# Patient Record
Sex: MALE | Race: WHITE | NOT HISPANIC OR LATINO | Employment: FULL TIME | URBAN - METROPOLITAN AREA
[De-identification: names, ages, dates, MRNs, and addresses within clinical notes are randomized per-mention and may not be internally consistent; named-entity substitution may affect disease eponyms.]

---

## 2017-01-23 ENCOUNTER — OFFICE VISIT (OUTPATIENT)
Dept: FAMILY MEDICINE | Facility: CLINIC | Age: 42
End: 2017-01-23
Payer: COMMERCIAL

## 2017-01-23 VITALS
WEIGHT: 184.5 LBS | BODY MASS INDEX: 24.99 KG/M2 | DIASTOLIC BLOOD PRESSURE: 86 MMHG | SYSTOLIC BLOOD PRESSURE: 102 MMHG | TEMPERATURE: 98.8 F | HEIGHT: 72 IN

## 2017-01-23 DIAGNOSIS — Z23 ENCOUNTER FOR IMMUNIZATION: ICD-10-CM

## 2017-01-23 DIAGNOSIS — J20.9 ACUTE BRONCHITIS, UNSPECIFIED ORGANISM: ICD-10-CM

## 2017-01-23 DIAGNOSIS — J45.30 MILD PERSISTENT ASTHMA WITHOUT COMPLICATION: Primary | ICD-10-CM

## 2017-01-23 PROCEDURE — 90471 IMMUNIZATION ADMIN: CPT | Performed by: PHYSICIAN ASSISTANT

## 2017-01-23 PROCEDURE — 99203 OFFICE O/P NEW LOW 30 MIN: CPT | Mod: 25 | Performed by: PHYSICIAN ASSISTANT

## 2017-01-23 PROCEDURE — 90686 IIV4 VACC NO PRSV 0.5 ML IM: CPT | Performed by: PHYSICIAN ASSISTANT

## 2017-01-23 RX ORDER — AZITHROMYCIN 250 MG/1
TABLET, FILM COATED ORAL
Qty: 6 TABLET | Refills: 0 | Status: SHIPPED | OUTPATIENT
Start: 2017-01-23 | End: 2018-09-05

## 2017-01-23 RX ORDER — ALBUTEROL SULFATE 90 UG/1
2 AEROSOL, METERED RESPIRATORY (INHALATION) EVERY 6 HOURS PRN
Qty: 1 INHALER | Refills: 3 | Status: SHIPPED | OUTPATIENT
Start: 2017-01-23 | End: 2018-03-11

## 2017-01-23 NOTE — PROGRESS NOTES
SUBJECTIVE:                                                    Henrik Almanzar is a 41 year old male who presents to clinic today for the following health issues:      Acute Illness   Acute illness concerns: cough  Onset: 1 month off and on     Fever: no    Chills/Sweats: YES- a few days ago    Headache (location?): no     Sinus Pressure:no    Conjunctivitis:  no    Ear Pain: no    Rhinorrhea: YES    Congestion: YES- chest    Sore Throat: no     Cough: YES - cough is usually dry, sometimes coughs up phlem.     Wheeze: YES    Decreased Appetite: YES    Nausea: no    Vomiting: no    Diarrhea:  no    Dysuria/Freq.: no    Fatigue/Achiness: YES    Sick/Strep Exposure: YES- patient and his son have been sick on and off for the past month.      Therapies Tried and outcome: albuterol inhaler, nyquil.       Asthma:  Hx of asthma, currently out of Advair and albuterol over the past 1 month, feels like his asthma has been worsening without these medications.  Has been having deep cough, wheezing and sinus pressure ongoing now x 1 month        Problem list and histories reviewed & adjusted, as indicated.  Additional history: as documented    Patient Active Problem List   Diagnosis     Anxiety state     Panic disorder without agoraphobia     Mild persistent asthma     Allergic rhinitis     Hyperlipidemia     HYPERLIPIDEMIA LDL GOAL <160     Past Surgical History   Procedure Laterality Date     Hc removal of tonsils,<10 y/o         Social History   Substance Use Topics     Smoking status: Former Smoker     Quit date: 09/07/2003     Smokeless tobacco: Not on file     Alcohol Use: Yes      Comment: 8 drinks per week, beer     Family History   Problem Relation Age of Onset     Hypertension Father      CANCER Paternal Aunt      cancer, unknown type     CANCER Paternal Grandfather      lung cancer     Lipids Father      CEREBROVASCULAR DISEASE Paternal Uncle      Great uncle         Current Outpatient Prescriptions   Medication  Sig Dispense Refill     fluticasone-salmeterol (ADVAIR) 250-50 MCG/DOSE diskus inhaler Inhale 1 puff into the lungs 2 times daily 3 Inhaler 3     albuterol (PROAIR HFA/PROVENTIL HFA/VENTOLIN HFA) 108 (90 BASE) MCG/ACT Inhaler Inhale 2 puffs into the lungs every 6 hours as needed for shortness of breath / dyspnea or wheezing 1 Inhaler 3     azithromycin (ZITHROMAX) 250 MG tablet Two tablets first day, then one tablet daily for four days. 6 tablet 0     MULTI-VITAMIN OR TABS one tablet daily 30 0     ALBUTEROL 90 MCG/ACT IN AERS 1-2 puffs every every 4-6 hours PRN 1 Inhaler prn     ADVAIR DISKUS 100-50 MCG/DOSE IN MISC 1 inhalation two times per day  1 6 MONTHS     Allergies   Allergen Reactions     Cats      Dogs      No Known Drug Allergies        ROS:  Constitutional, HEENT, cardiovascular, pulmonary, gi and gu systems are negative, except as otherwise noted.    OBJECTIVE:                                                    /86 mmHg  Temp(Src) 98.8  F (37.1  C) (Tympanic)  Ht 6' (1.829 m)  Wt 184 lb 8 oz (83.689 kg)  BMI 25.02 kg/m2  Body mass index is 25.02 kg/(m^2).  GENERAL: healthy, alert and no distress  EYES: Eyes grossly normal to inspection  HENT: ear canals and TM's normal, nose and mouth without ulcers or lesions  NECK: no adenopathy  RESP: lungs clear to auscultation - no rales, rhonchi or wheezes  CV: regular rate and rhythm, normal S1 S2, no S3 or S4, no murmur  Diagnostic Test Results:  none      ASSESSMENT/PLAN:                                                      1. Acute bronchitis, unspecified organism  Ongoing cough and wheezing x 1 month, advise beginning daily controller again and this was refilled. Advise PRN use of albuterol, and will try z pack given length of symptoms.  - azithromycin (ZITHROMAX) 250 MG tablet; Two tablets first day, then one tablet daily for four days.  Dispense: 6 tablet; Refill: 0    2. Mild persistent asthma without complicatio  - fluticasone-salmeterol  (ADVAIR) 250-50 MCG/DOSE diskus inhaler; Inhale 1 puff into the lungs 2 times daily  Dispense: 3 Inhaler; Refill: 3  - albuterol (PROAIR HFA/PROVENTIL HFA/VENTOLIN HFA) 108 (90 BASE) MCG/ACT Inhaler; Inhale 2 puffs into the lungs every 6 hours as needed for shortness of breath / dyspnea or wheezing  Dispense: 1 Inhaler; Refill: 3    3. Encounter for immunization  - ADMIN 1st VACCINE  - FLU VACCINE, 3 YRS +, IM (FLUZONE)    See Patient Instructions    Declan You PA-C  [facility]

## 2017-01-23 NOTE — NURSING NOTE
Chief Complaint   Patient presents with     Cough       Initial /86 mmHg  Temp(Src) 98.8  F (37.1  C) (Tympanic)  Ht 6' (1.829 m)  Wt 184 lb 8 oz (83.689 kg)  BMI 25.02 kg/m2 Estimated body mass index is 25.02 kg/(m^2) as calculated from the following:    Height as of this encounter: 6' (1.829 m).    Weight as of this encounter: 184 lb 8 oz (83.689 kg).  BP completed using cuff size: arya Hernández MA

## 2017-01-23 NOTE — PATIENT INSTRUCTIONS
Acute Bronchitis                  What is acute bronchitis?   Bronchitis is an infection of the air passages--that is, the tubes that connect the windpipe to the lungs. It causes swelling and irritation of the airways. With acute bronchitis you usually have a cough that produces phlegm and pain behind the breastbone when you breathe deeply or cough.   How does it occur?   Bronchitis often occurs with viral infections of the respiratory tract, such as colds and flu. Bronchitis may also be caused by bacterial infections. It may occur with childhood illnesses such as measles and whooping cough.   Attacks are most frequent during the winter or when the level of air pollution is high.   Infants, young children, older adults, smokers, and people with heart disease or lung disease (including asthma and allergies) are most likely to get acute bronchitis.   What are the symptoms?   Symptoms may include:   a deep cough that produces yellowish or greenish phlegm   pain behind the breastbone when you breathe deeply or cough   wheezing   feeling short of breath   fever   chills   headache   sore muscles.   How is it diagnosed?   Your healthcare provider will ask about your symptoms and examine you. You may have tests, such as:   a test of phlegm to look for bacteria   chest X-ray   blood tests.   How is it treated?   Acute bronchitis often does not require medical treatment. Resting at home and drinking plenty of fluids to keep the mucus loose may be all you need to do to get better in a few days. If your symptoms are severe or you have other health problems (such as heart or lung disease or diabetes), you may need to take antibiotics.   How long will the effects last?   Most of the time acute bronchitis clears up in a few days. Your cough may slowly get better in 1 to 2 weeks.   It may take you longer to recover if:   You are a smoker.   You live in an area where air pollution is a problem.   You have a heart  or lung disease.   You have any other continuing health problems.   How can I take care of myself?   You can help yourself by:   following the full treatment your healthcare provider recommends   using a vaporizer, humidifier, or steam from hot water to add moisture to the air   drinking plenty of liquids   taking cough medicine if recommended by your healthcare provider   resting in bed   taking aspirin or acetaminophen to reduce fever and relieve headache and muscle pain (Check with your healthcare provider before you give any medicine that contains aspirin or salicylates to a child or teen. This includes medicines like baby aspirin, some cold medicines, and Pepto Bismol. Children and teens who take aspirin are at risk for a serious illness called Reye's syndrome.)   eating healthy meals.   Call your healthcare provider if:   You have trouble breathing.   You have a fever of 101.5?F (38.6?C) or higher.   You cough up blood.   Your symptoms are getting worse instead of better.   You don't start to feel better after 3 days of treatment.   You have any symptoms that concern you.   How can I help prevent acute bronchitis?   To reduce your risk of getting a respiratory infection:   Do not smoke.   Wash your hands often, especially when you are around people with colds (upper respiratory infections).   If you have asthma or allergies, keep your symptoms under good control.   Get regular exercise.   Eat healthy foods.       Published by "[employer].  This content is reviewed periodically and is subject to change as new health information becomes available. The information is intended to inform and educate and is not a replacement for medical evaluation, advice, diagnosis or treatment by a healthcare professional.   Developed by "[employer].   ? 2010 "[employer] and/or its affiliates. All Rights Reserved.   Copyright   [employer] 2011

## 2017-01-23 NOTE — MR AVS SNAPSHOT
After Visit Summary   1/23/2017    Henrik Almanzar    MRN: [id number]           Patient Information     Date Of Birth          1975        Visit Information        Provider Department      1/23/2017 8:40 AM Declan You PA-C [facility]        Today's Diagnoses     Mild persistent asthma without complication    -  1     Acute bronchitis, unspecified organism           Care Instructions                       Acute Bronchitis                  What is acute bronchitis?   Bronchitis is an infection of the air passages--that is, the tubes that connect the windpipe to the lungs. It causes swelling and irritation of the airways. With acute bronchitis you usually have a cough that produces phlegm and pain behind the breastbone when you breathe deeply or cough.   How does it occur?   Bronchitis often occurs with viral infections of the respiratory tract, such as colds and flu. Bronchitis may also be caused by bacterial infections. It may occur with childhood illnesses such as measles and whooping cough.   Attacks are most frequent during the winter or when the level of air pollution is high.   Infants, young children, older adults, smokers, and people with heart disease or lung disease (including asthma and allergies) are most likely to get acute bronchitis.   What are the symptoms?   Symptoms may include:   a deep cough that produces yellowish or greenish phlegm   pain behind the breastbone when you breathe deeply or cough   wheezing   feeling short of breath   fever   chills   headache   sore muscles.   How is it diagnosed?   Your healthcare provider will ask about your symptoms and examine you. You may have tests, such as:   a test of phlegm to look for bacteria   chest X-ray   blood tests.   How is it treated?   Acute bronchitis often does not require medical treatment. Resting at home and drinking plenty of fluids to keep the mucus loose may be all you need to do to get  better in a few days. If your symptoms are severe or you have other health problems (such as heart or lung disease or diabetes), you may need to take antibiotics.   How long will the effects last?   Most of the time acute bronchitis clears up in a few days. Your cough may slowly get better in 1 to 2 weeks.   It may take you longer to recover if:   You are a smoker.   You live in an area where air pollution is a problem.   You have a heart or lung disease.   You have any other continuing health problems.   How can I take care of myself?   You can help yourself by:   following the full treatment your healthcare provider recommends   using a vaporizer, humidifier, or steam from hot water to add moisture to the air   drinking plenty of liquids   taking cough medicine if recommended by your healthcare provider   resting in bed   taking aspirin or acetaminophen to reduce fever and relieve headache and muscle pain (Check with your healthcare provider before you give any medicine that contains aspirin or salicylates to a child or teen. This includes medicines like baby aspirin, some cold medicines, and Pepto Bismol. Children and teens who take aspirin are at risk for a serious illness called Reye's syndrome.)   eating healthy meals.   Call your healthcare provider if:   You have trouble breathing.   You have a fever of 101.5?F (38.6?C) or higher.   You cough up blood.   Your symptoms are getting worse instead of better.   You don't start to feel better after 3 days of treatment.   You have any symptoms that concern you.   How can I help prevent acute bronchitis?   To reduce your risk of getting a respiratory infection:   Do not smoke.   Wash your hands often, especially when you are around people with colds (upper respiratory infections).   If you have asthma or allergies, keep your symptoms under good control.   Get regular exercise.   Eat healthy foods.       Published by [employer].  This content is reviewed  "periodically and is subject to change as new health information becomes available. The information is intended to inform and educate and is not a replacement for medical evaluation, advice, diagnosis or treatment by a healthcare professional.   Developed by [employer].   ? 2010 [facility] and/or its affiliates. All Rights Reserved.   [employer]   Clinical [employer]                 Follow-ups after your visit        Who to contact     If you have questions or need follow up information about today's clinic visit or your schedule please contact [facility] [place] directly at [phone].  Normal or non-critical lab and imaging results will be communicated to you by [employer], letter or phone within 4 business days after the clinic has received the results. If you do not hear from us within 7 days, please contact the clinic through [employer] or phone. If you have a critical or abnormal lab result, we will notify you by phone as soon as possible.  Submit refill requests through [facility] or call your pharmacy and they will forward the refill request to us. Please allow 3 business days for your refill to be completed.          Additional Information About Your Visit        [facility] Information     [facility] lets you send messages to your doctor, view your test results, renew your prescriptions, schedule appointments and more. To sign up, go to [email or web address] . Click on \"Log in\" on the left side of the screen, which will take you to the Welcome page. Then click on \"Sign up Now\" on the right side of the page.     You will be asked to enter the access code listed below, as well as some personal information. Please follow the directions to create your username and password.     Your access code is: [email or web address]  Expires: 2017  9:25 AM     Your access code will  in 90 days. If you need help or a new code, please call your [facility] or [phone].        Care EveryWhere ID     This " is your Care EveryWhere ID. This could be used by other organizations to access your [place] medical records  [id number]        Your Vitals Were     Temperature Height BMI (Body Mass Index)             98.8  F (37.1  C) (Tympanic) 6' (1.829 m) 25.02 kg/m2          Blood Pressure from Last 3 Encounters:   01/23/17 102/86   05/23/08 143/91   10/26/07 124/71    Weight from Last 3 Encounters:   01/23/17 184 lb 8 oz (83.689 kg)   05/23/08 189 lb (85.73 kg)   10/26/07 190 lb (86.183 kg)              Today, you had the following     No orders found for display         Today's Medication Changes          These changes are accurate as of: 1/23/17  9:25 AM.  If you have any questions, ask your nurse or doctor.               Start taking these medicines.        Dose/Directions    albuterol 108 (90 BASE) MCG/ACT Inhaler   Commonly known as:  PROAIR HFA/PROVENTIL HFA/VENTOLIN HFA   Used for:  Mild persistent asthma without complication   Started by:  Declan You PA-C        Dose:  2 puff   Inhale 2 puffs into the lungs every 6 hours as needed for shortness of breath / dyspnea or wheezing   Quantity:  1 Inhaler   Refills:  3       azithromycin 250 MG tablet   Commonly known as:  ZITHROMAX   Used for:  Acute bronchitis, unspecified organism   Started by:  Declan You PA-C        Two tablets first day, then one tablet daily for four days.   Quantity:  6 tablet   Refills:  0         These medicines have changed or have updated prescriptions.        Dose/Directions    * ADVAIR DISKUS 100-50 MCG/DOSE diskus inhaler   This may have changed:  Another medication with the same name was added. Make sure you understand how and when to take each.   Used for:  Mild persistent asthma   Generic drug:  fluticasone-salmeterol   Changed by:  Ramesh Leigh MD        1 inhalation two times per day   Quantity:  1   Refills:  6 MONTHS       * fluticasone-salmeterol 250-50 MCG/DOSE diskus inhaler   Commonly known as:   ADVAIR   This may have changed:  You were already taking a medication with the same name, and this prescription was added. Make sure you understand how and when to take each.   Used for:  Mild persistent asthma without complication   Changed by:  Declan You PA-C        Dose:  1 puff   Inhale 1 puff into the lungs 2 times daily   Quantity:  3 Inhaler   Refills:  3       * Notice:  This list has 2 medication(s) that are the same as other medications prescribed for you. Read the directions carefully, and ask your doctor or other care provider to review them with you.         Where to get your medicines      These medications were sent to [id number] - [place], [place] - [address] [facility]  [address], [place] [address]    Hours:  24-hours Phone:  [phone]    - albuterol 108 (90 BASE) MCG/ACT Inhaler  - azithromycin 250 MG tablet  - fluticasone-salmeterol 250-50 MCG/DOSE diskus inhaler             Primary Care Provider Office Phone # Fax #    Ramesh Leigh [phone]       [address] [place] [address] [place] [address]        Thank you!     [facility] you [facility]  [facility] your [facility]. Our goal is always to provide you with excellent care. Hearing back from our patients is one way we can continue to improve our services. Please take a few minutes to complete the written survey that you may receive in the mail after your visit with us. Thank you!             Your Updated Medication List - Protect others around you: Learn how to safely use, store and throw away your medicines at www.disposemymeds.org.          This list is accurate as of: 1/23/17  9:25 AM.  Always use your most recent med list.                   Brand Name Dispense Instructions for use    * ADVAIR DISKUS 100-50 MCG/DOSE diskus inhaler   Generic drug:  fluticasone-salmeterol     1    1 inhalation two times per day        * fluticasone-salmeterol 250-50 MCG/DOSE diskus inhaler    ADVAIR    3 Inhaler    Inhale 1 puff into the lungs 2 times daily       albuterol 108 (90 BASE) MCG/ACT Inhaler    PROAIR HFA/PROVENTIL HFA/VENTOLIN HFA    1 Inhaler    Inhale 2 puffs into the lungs every 6 hours as needed for shortness of breath / dyspnea or wheezing       albuterol 90 MCG/ACT inhaler     1 Inhaler    1-2 puffs every every 4-6 hours PRN       azithromycin 250 MG tablet    ZITHROMAX    6 tablet    Two tablets first day, then one tablet daily for four days.       Multi-vitamin Tabs tablet   Generic drug:  multivitamin, therapeutic with minerals     30    one tablet daily       * Notice:  This list has 2 medication(s) that are the same as other medications prescribed for you. Read the directions carefully, and ask your doctor or other care provider to review them with you.

## 2017-06-06 DIAGNOSIS — J45.30 MILD PERSISTENT ASTHMA WITHOUT COMPLICATION: Primary | ICD-10-CM

## 2017-06-06 NOTE — TELEPHONE ENCOUNTER
Pop up message received when pending      ADVAIR DISKUS 100-50 MCG/DOSE IN MISC       Last Written Prescription Date: 10/8/09  Last Fill Quantity: 1, # refills: 6 mos    Last Office Visit with [facility], [facility] or [facility] prescribing provider:  1/23/17   Future Office Visit:       Date of Last Asthma Action Plan Letter:   Asthma Action Plan Q1 Year    Topic Date Due     Asthma Action Plan - yearly  12/15/2006      Asthma Control Test:   ACT Total Scores 1/23/2017   ACT TOTAL SCORE (Goal Greater than or Equal to 20) 10   In the past 12 months, how many times did you visit the emergency room for your asthma without being admitted to the hospital? 0   In the past 12 months, how many times were you hospitalized overnight because of your asthma? 0       Date of Last Spirometry Test:   No results found for this or any previous visit.        Rolanda Degroot TC

## 2017-06-06 NOTE — TELEPHONE ENCOUNTER
Please send patient ACT and request that he send this back.  Can we ask how his asthma has been over the past few months?  Last ACT 10, should get updated form

## 2017-06-06 NOTE — TELEPHONE ENCOUNTER
Routing refill request to provider for review/approval because:  ACT < [facility] protocol for RN Fill  Radha Hinds RN  Triage [employer]

## 2017-06-06 NOTE — TELEPHONE ENCOUNTER
Reason for Call:  Medication or medication refill: advair    Do you use a [facility]?  Name of the pharmacy and phone number for the current request:  [facility] [address]    Name of the medication requested: advair    Other request: na    Can we leave a detailed message on this number? YES    Phone number patient can be reached at: Home number on file [phone] (home)    Best Time: any    Call taken on 6/6/2017 at 12:13 PM by Moustapha Wright

## 2017-06-07 NOTE — TELEPHONE ENCOUNTER
Spoke with the patient, he reports that his asthma is stable and well controlled. Routing to team to mail ACT to patient to complete.   Tiffanie Drake RN   [facility] - Triage

## 2017-06-13 ENCOUNTER — TELEPHONE (OUTPATIENT)
Dept: FAMILY MEDICINE | Facility: CLINIC | Age: 42
End: 2017-06-13

## 2017-06-13 DIAGNOSIS — J45.30 MILD PERSISTENT ASTHMA WITHOUT COMPLICATION: Primary | ICD-10-CM

## 2017-06-13 NOTE — TELEPHONE ENCOUNTER
Advair Diskus is not covered by pt's insurance. Prior Authorization needed. Would you like to do PA? Thank you.    Gypsy Aguillon MA

## 2017-06-13 NOTE — TELEPHONE ENCOUNTER
Spoke with pharmacy, they cannot see his formulary and are not able to tell me what alternative medication would be covered. They advised trying to send in something different and they would run it through his insurance.   Tiffanie Drake RN   [facility] - Triage

## 2017-06-13 NOTE — TELEPHONE ENCOUNTER
Please ask pharmacy to switch this to covered equivalent alternative medication in the same class or find out what this is so we can send it in

## 2017-06-30 ENCOUNTER — TRANSFERRED RECORDS (OUTPATIENT)
Dept: HEALTH INFORMATION MANAGEMENT | Facility: CLINIC | Age: 42
End: 2017-06-30

## 2017-09-13 DIAGNOSIS — J45.30 MILD PERSISTENT ASTHMA WITHOUT COMPLICATION: ICD-10-CM

## 2017-09-13 NOTE — LETTER
September 21, 2017      Henrik Almanzar  [address]   [place] [address]        Dear Henrik,      Please fill out the Asthma Control Test and mail it back in the self addressed envelope. We need this  completed so that we can continue refilling your asthma medications and to make sure your asthma is under control. Any questions please feel free to call.        Sincerely,  Team 2 and Declan You, MPH, PA-C  [facility]  [phone]

## 2017-09-13 NOTE — TELEPHONE ENCOUNTER
DULERA 100-5 MCG/ACT oral inhaler      Last Written Prescription Date: 6/13/17  Last Fill Quantity: 13 g, # refills: 1    Last Office Visit with [facility], [facility] or [facility] prescribing provider:  1/23/17   Future Office Visit:       Date of Last Asthma Action Plan Letter:   Asthma Action Plan Q1 Year    Topic Date Due     Asthma Action Plan - yearly  12/15/2006      Asthma Control Test:   ACT Total Scores 1/23/2017   ACT TOTAL SCORE (Goal Greater than or Equal to 20) 10   In the past 12 months, how many times did you visit the emergency room for your asthma without being admitted to the hospital? 0   In the past 12 months, how many times were you hospitalized overnight because of your asthma? 0       Date of Last Spirometry Test:   No results found for this or any previous visit.

## 2017-09-13 NOTE — TELEPHONE ENCOUNTER
Routing refill request to provider for review/approval because:  Act out of date    Anjali Moscoso RN  [facility]  [phone]

## 2017-09-14 RX ORDER — MOMETASONE FUROATE AND FORMOTEROL FUMARATE DIHYDRATE 100; 5 UG/1; UG/1
AEROSOL RESPIRATORY (INHALATION)
Qty: 13 G | Refills: 3 | Status: SHIPPED | OUTPATIENT
Start: 2017-09-14 | End: 2018-09-05

## 2017-10-04 ASSESSMENT — ASTHMA QUESTIONNAIRES: ACT_TOTALSCORE: 23

## 2018-02-22 ENCOUNTER — TELEPHONE (OUTPATIENT)
Dept: FAMILY MEDICINE | Facility: CLINIC | Age: 43
End: 2018-02-22

## 2018-02-22 DIAGNOSIS — J45.909 UNCOMPLICATED ASTHMA, UNSPECIFIED ASTHMA SEVERITY, UNSPECIFIED WHETHER PERSISTENT: Primary | ICD-10-CM

## 2018-02-22 NOTE — TELEPHONE ENCOUNTER
Prior Authorization Specialty Medication Request    Medication/Dose: dulera   Diagnosis and ICD: Mild persistent asthma without complication [J45.30  New/Renewal/Insurance Change PA: renew     Important Lab Values:     Previously Tried and Failed Therapies:     Rationale:      Would you like to include any research articles?    If yes please include the hyperlink(s) below or fax @ [phone].    (Include Name and MRN)    If you received a fax notification from an outside Pharmacy;  Pharmacy Name:[facility]   Pharmacy #: [phone]  Pharmacy Fax: [phone]

## 2018-02-27 NOTE — TELEPHONE ENCOUNTER
PA Initiation    Medication: DULERA 100-5 MCG/ACT   Insurance Company: [employer] - Phone [phone] Fax [phone]  Pharmacy Filling the Rx: [facility] - [place], [place] - [address] AT [facility] [address] [place] [address]  Filling Pharmacy Phone: [phone]  Filling Pharmacy Fax: [phone]  Start Date: 2/27/2018    Central Prior Authorization Team   Phone: [phone]

## 2018-02-28 NOTE — TELEPHONE ENCOUNTER
PRIOR AUTHORIZATION DENIED    Medication: DULERA 100-5 MCG/ACT -denied     Denial Date: 2/28/2018    Denial Rational:  Must try and fail 2 drugs. Have tried none. Symbicort and Spiriva       Appeal Information:

## 2018-03-01 RX ORDER — BUDESONIDE AND FORMOTEROL FUMARATE DIHYDRATE 160; 4.5 UG/1; UG/1
2 AEROSOL RESPIRATORY (INHALATION) 2 TIMES DAILY
Qty: 3 INHALER | Refills: 3 | Status: SHIPPED | OUTPATIENT
Start: 2018-03-01 | End: 2018-09-05

## 2018-03-01 NOTE — TELEPHONE ENCOUNTER
Left non-detailed message to call back and ask to speak with a triage nurse.   Brissa Millard RN

## 2018-03-01 NOTE — TELEPHONE ENCOUNTER
Please advise Dulera was denied.    Pt needs to try symbicort or spiriva he is willing to try new med.    Please send new med.    Sushma Varela CMA

## 2018-03-11 DIAGNOSIS — J45.30 MILD PERSISTENT ASTHMA WITHOUT COMPLICATION: ICD-10-CM

## 2018-03-13 RX ORDER — ALBUTEROL SULFATE 90 UG/1
AEROSOL, METERED RESPIRATORY (INHALATION)
Qty: 1 INHALER | Refills: 0 | Status: SHIPPED | OUTPATIENT
Start: 2018-03-13 | End: 2018-08-19

## 2018-03-13 NOTE — TELEPHONE ENCOUNTER
Medication is being filled for 1 time refill only due to:  Patient needs to be seen because it has been more than one year since last visit.   Please call patient and assist with appt for further refill.  Thank you    Mariely Ordoñez RN

## 2018-03-13 NOTE — TELEPHONE ENCOUNTER
"Requested Prescriptions   Pending Prescriptions Disp Refills     VENTOLIN  (90 BASE) MCG/ACT Inhaler [Pharmacy Med Name: VENTOLIN HFA INH W/DOS CTR 200PUFFS]  Last Written Prescription Date:  1/23/17  Last Fill Quantity: 1,  # refills: 3   Last office visit: 1/23/2017 with prescribing provider:  Avelino   Future Office Visit:      ACT Total Scores 1/23/2017 10/3/2017   ACT TOTAL SCORE (Goal Greater than or Equal to 20) 10 23   In the past 12 months, how many times did you visit the emergency room for your asthma without being admitted to the hospital? 0 0   In the past 12 months, how many times were you hospitalized overnight because of your asthma? 0 0        18 g 0         Sig: INHALE 2 PUFFS INTO THE LUNGS EVERY 6 HOURS AS NEEDED FOR SHORTNESS OF BREATH/DYSPNEA OR WHEEZING    Asthma Maintenance Inhalers - Anticholinergics Failed    3/11/2018  9:56 AM       Failed - Recent (6 mo) or future (30 days) visit within the authorizing provider's specialty    Patient had office visit in the last 6 months or has a visit in the next 30 days with authorizing provider or within the authorizing provider's specialty.  See \"Patient Info\" tab in inbasket, or \"Choose Columns\" in Meds & Orders section of the refill encounter.           Passed - Patient is age 12 years or older       Passed - Asthma control assessment score within normal limits in last 6 months    Please review ACT score.             "

## 2018-03-14 NOTE — TELEPHONE ENCOUNTER
Left message for pt to call back : Patient needs to be seen because it has been more than one year since last visit.   Allison Mandujano,

## 2018-08-15 ENCOUNTER — OFFICE VISIT (OUTPATIENT)
Dept: PSYCHOLOGY | Facility: CLINIC | Age: 43
End: 2018-08-15

## 2018-08-15 DIAGNOSIS — F41.9 ANXIETY DISORDER: Primary | ICD-10-CM

## 2018-08-15 DIAGNOSIS — F10.90 ALCOHOL USE DISORDER: ICD-10-CM

## 2018-08-15 PROCEDURE — 90791 PSYCH DIAGNOSTIC EVALUATION: CPT | Performed by: PSYCHOLOGIST

## 2018-08-15 ASSESSMENT — PATIENT HEALTH QUESTIONNAIRE - PHQ9: 5. POOR APPETITE OR OVEREATING: SEVERAL DAYS

## 2018-08-15 ASSESSMENT — ANXIETY QUESTIONNAIRES
3. WORRYING TOO MUCH ABOUT DIFFERENT THINGS: SEVERAL DAYS
GAD7 TOTAL SCORE: 6
5. BEING SO RESTLESS THAT IT IS HARD TO SIT STILL: NOT AT ALL
2. NOT BEING ABLE TO STOP OR CONTROL WORRYING: SEVERAL DAYS
1. FEELING NERVOUS, ANXIOUS, OR ON EDGE: SEVERAL DAYS
7. FEELING AFRAID AS IF SOMETHING AWFUL MIGHT HAPPEN: SEVERAL DAYS
6. BECOMING EASILY ANNOYED OR IRRITABLE: SEVERAL DAYS
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
## 2018-08-15 NOTE — MR AVS SNAPSHOT
MRN:[id number]                      After Visit Summary   8/15/2018    Henrik Almanzar    MRN: [id number]           Visit Information        Provider Department      8/15/2018 11:30 AM Mralyn Banks LP [facility]      Your next 10 appointments already scheduled     Aug 22, 2018  8:30 AM CDT   Return Visit with Marlyn Banks LP   [facility] ([facility])    [address] [id number] [phone]              Care EveryWhere ID     This is your Care EveryWhere ID. This could be used by other organizations to access your [place] medical records  [id number]        Equal Access to Services     JAMES DUCKWORTH : Hadii everett ahumada hadaiyanao Sofreeman, waaxda luqadaha, qaybta kaalmada adeegyada, saurabh whiting. So [facility] [phone].    ATENCIÓN: Si habla español, tiene a valencia disposición servicios gratuitos de asistencia lingüística. Llame al [phone].    We comply with applicable federal civil rights laws and [place] laws. We do not discriminate on the basis of race, color, national origin, age, disability, sex, sexual orientation, or gender identity.

## 2018-08-15 NOTE — Clinical Note
Yoandy Manriquez is self-referred for therapy. First goal will be to complete a CD assessment and obtain help for alcohol abuse. Will concurrently work in therapy on underlying issues. I'll keep you posted.  Marlyn

## 2018-08-16 ASSESSMENT — ANXIETY QUESTIONNAIRES: GAD7 TOTAL SCORE: 6

## 2018-08-16 ASSESSMENT — PATIENT HEALTH QUESTIONNAIRE - PHQ9: SUM OF ALL RESPONSES TO PHQ QUESTIONS 1-9: 6

## 2018-08-19 DIAGNOSIS — J45.30 MILD PERSISTENT ASTHMA WITHOUT COMPLICATION: ICD-10-CM

## 2018-08-20 RX ORDER — ALBUTEROL SULFATE 90 UG/1
AEROSOL, METERED RESPIRATORY (INHALATION)
Qty: 18 G | Refills: 0 | Status: SHIPPED | OUTPATIENT
Start: 2018-08-20 | End: 2018-09-19

## 2018-08-20 NOTE — TELEPHONE ENCOUNTER
"Requested Prescriptions   Pending Prescriptions Disp Refills     VENTOLIN  (90 Base) MCG/ACT inhaler [Pharmacy Med Name: VENTOLIN HFA INH W/DOS CTR 200PUFFS]  Last Written Prescription Date:  3/13/18  Last Fill Quantity: 1,  # refills: 0   Last office visit: 1/23/2017 with prescribing provider:  Avelino   Future Office Visit:   Next 5 appointments (look out 90 days)     Aug 22, 2018  8:30 AM CDT   Return Visit with Marlyn Banks LP   [facility] ([facility])    [address] [id number] [phone]                  18 g 0     Sig: INHALE 2 PUFFS INTO THE LUNGS EVERY 6 HOURS AS NEEDED FOR WHEEZING AND SHORTNESS OF BREATH    Asthma Maintenance Inhalers - Anticholinergics Failed    8/19/2018  4:08 AM       Failed - Asthma control assessment score within normal limits in last 6 months    Please review ACT score.   ACT Total Scores 1/23/2017 10/3/2017   ACT TOTAL SCORE (Goal Greater than or Equal to 20) 10 23   In the past 12 months, how many times did you visit the emergency room for your asthma without being admitted to the hospital? 0 0   In the past 12 months, how many times were you hospitalized overnight because of your asthma? 0 0              Failed - Recent (6 mo) or future (30 days) visit within the authorizing provider's specialty    Patient had office visit in the last 6 months or has a visit in the next 30 days with authorizing provider or within the authorizing provider's specialty.  See \"Patient Info\" tab in inbasket, or \"Choose Columns\" in Meds & Orders section of the refill encounter.           Passed - Patient is age 12 years or older          "

## 2018-08-20 NOTE — TELEPHONE ENCOUNTER
Routing refill request to provider for review/approval because:  Aleta given x1 and patient did not follow up, please advise  Patient needs to be seen because it has been more than 1 year since last office visit.  Radha Hinds RN - Triage  [facility]

## 2018-08-22 ENCOUNTER — OFFICE VISIT (OUTPATIENT)
Dept: PSYCHOLOGY | Facility: CLINIC | Age: 43
End: 2018-08-22

## 2018-08-22 DIAGNOSIS — F10.90 ALCOHOL USE DISORDER: ICD-10-CM

## 2018-08-22 DIAGNOSIS — F41.9 ANXIETY DISORDER: Primary | ICD-10-CM

## 2018-08-22 PROCEDURE — 90834 PSYTX W PT 45 MINUTES: CPT | Performed by: PSYCHOLOGIST

## 2018-08-22 NOTE — PROGRESS NOTES
"                                             Progress Note    Client Name: Henrik Almanzar  Date: 8/22/18         Service Type: Individual      Session Start Time: 08:30  Session End Time: 09:15      Session Length: 45-50     Session #: 2     Attendees: Client attended alone    Treatment Plan Last Reviewed: 8/22/18  PHQ-9 / SASHA-7 Last Reviewed: 8/15/18     DATA      Progress Since Last Session (Related to Symptoms / Goals / Homework):   Symptoms: Stable    Homework: In progress      Episode of Care Goals: New objectives established today     Current / Ongoing Stressors and Concerns:   Alcohol abuse   Single parent   History of childhood trauma   Financial strain     Treatment Objective(s) Addressed in This Session:   Achieve and maintain sobriety for an extended period of time   Use >2 skills for emotion regulation      Intervention:   Discussed and agreed upon goals for treatment. Talked at length about the function of alcohol in Client's life--regulating, numbing, conditioned response. He described the internal conflict he often experiences between his cravings and his logical thoughts. Provided education about brain functioning and the different roles of different parts (e.g., reward axis vs. prefrontal cortex)--this helps Client to understand in a different way the conflict he feels. In this context, provided rationale for self-regulation strategies such as deep breathing as a way of settling or \"resetting\" the nervous system. Taught a basic relaxation breathing exercise in session; Client reported initial positive response. Provided online resources for further practice. Agreed that Client will begin tracking emotions and cravings so we can look for patterns of data. He stated that he called [facility] intake yesterday to schedule a CD assessment and is committed to following through. We discussed other potential treatment interventions to consider (now or at a later time): EMDR for trauma, Dual-Diagnosis DBT " program. Client will read more about these and consider.        ASSESSMENT: Current Emotional / Mental Status (status of significant symptoms):   Risk status (Self / Other harm or suicidal ideation)   Client denies current fears or concerns for personal safety.   Client denies current or recent suicidal ideation or behaviors.   Client denies current or recent homicidal ideation or behaviors.   Client denies current or recent self injurious behavior or ideation.   Client denies other safety concerns.   Client reports there has been no change in risk factors since his last session.     Client reports there has been no change in protective factors since his last session.     A safety and risk management plan has not been developed at this time, however client was given the after-hours number / 911 should there be a change in any of these risk factors.     Appearance:   Appropriate    Eye Contact:   Good    Psychomotor Behavior: Normal    Attitude:   Cooperative , encouraged   Orientation:   All   Speech    Rate / Production: Normal     Volume:  Normal    Mood:    Mildly anxious    Affect:    Appropriate    Thought Content:  Clear    Thought Form:  Coherent  Logical    Insight:    Fair      Medication Review:   No current psychiatric medications prescribed     Medication Compliance:   NA     Changes in Health Issues:   None reported     Chemical Use Review:   Substance Use: Client acknowledges problematic alcohol use, would like to stop drinking but has been unable to; has agreed to a formal CD assessment and has called to schedule with [facility]     Tobacco Use: No current tobacco use.       Collateral Reports Completed:   Not Applicable    PLAN: (Client Tasks / Therapist Tasks / Other)  Client will practice relaxation breathing. Online resources were provided to assist with practice. He will begin tracking cravings/emotions/patterns of use in a notebook. Client stated his commitment to following through with CD assessment.  Will consider referrals for other potential treatment modalities (EMDR, DBT for dual-diagnosis disorders), depending on Client's preferences and response to treatment. Client will call to schedule future appointments.      Marlyn Banks LP                                                       ________________________________________________________________________    Treatment Plan    Client's Name: Henrik Almanzar  YOB: 1975    Date: 8/22/18    DSM-V Diagnoses: F10.99 Substance-Related & Addictive Disorders Alcohol Use Disorder Moderate, F41.9 Anxiety Disorder  Psychosocial / Contextual Factors: Alcohol abuse, single parent, history of childhood trauma, financial strain  WHODAS: 16    Referral / Collaboration:  The following referral(s) will be initiated: CD assessment at [facility]. May consider referrals for other treatment modalities (EMDR, DBT for dual-diagnosis disorders)    Anticipated number of session or this episode of care: evaluate every 90 days      MeasurableTreatment Goal(s) related to diagnosis / functional impairment(s)  Goal 1: Client will work through past issues/traumas that have not been resolved.    I will know I've met my goal when I feel confident in the fact that I don't need to reach for a drink when I'm stressed out or feeling intense feelings, that I have other tools to deal with feelings.      Objective #A (Client Action)    Client will identify how the use of substances contributes to the avoidance of emotions associated with the trauma.  Status: New - Date: 8/22/18     Intervention(s)  Therapist will provide education and strategies on regulation and self-soothing.    Objective #B  Client will process trauma and rewrite pieces of his personal narrative that may be distorted.  Status: New - Date: 8/22/18     Intervention(s)  Therapist will provide support and guidance; teach cognitive reframing strategies.      Goal 2: Client will achieve and maintain sobriety for an extended  period of time.    I will know I've met my goal when I'm not drinking.      Objective #A (Client Action)    Status: New - Date: 8/22/18     Client will complete CD assessment and follow recommendations.    Intervention(s)  Therapist will provide resources and support.    Objective #B  Client will use >2 other skills for emotion regulation.    Status: New - Date: 8/22/18     Intervention(s)  Therapist will teach self-regulation strategies, self-soothing, mindfulness techniques, CBT skills.      Goal 3: Client will continue to improve parenting skills.    I will know I've met my goal when I feel more calm and confident in managing whatever comes up with my son.      Objective #A (Client Action)    Status: New - Date: 8/22/18     Client will read recommended parenting books.    Intervention(s)  Therapist will provide book recommendations.    Objective #B  Client will focus on maintaining his own emotional control, regardless of how his son is behaving.    Status: New - Date: 8/22/18     Intervention(s)  Therapist will provide guidance, support, accountability.      Client has reviewed and agreed to the above plan.      Marlyn Banks, SASHA  August 22, 2018

## 2018-08-22 NOTE — MR AVS SNAPSHOT
MRN:[id number]                      After Visit Summary   8/22/2018    Henrik Almanzar    MRN: [id number]           Visit Information        Provider Department      8/22/2018 8:30 AM Marlyn Banks LP [facility]      Care EveryWhere ID     This is your Care EveryWhere ID. This could be used by other organizations to access your [place] medical records  [id number]        Equal Access to Services     JAMES DUCKWORTH : Hadii aad ku hadasho Soomaali, waaxda luqadaha, qaybta kaalmada adeegyada, saurabh gravesin haylaureenn edwin parikh . So [facility] [phone].    ATENCIÓN: Si habla español, tiene a valencia disposición servicios gratuitos de asistencia lingüística. Llame al [phone].    We comply with applicable federal civil rights laws and [place] laws. We do not discriminate on the basis of race, color, national origin, age, disability, sex, sexual orientation, or gender identity.

## 2018-09-10 ENCOUNTER — TELEPHONE (OUTPATIENT)
Dept: FAMILY MEDICINE | Facility: CLINIC | Age: 43
End: 2018-09-10

## 2018-09-10 DIAGNOSIS — J45.30 MILD PERSISTENT ASTHMA WITHOUT COMPLICATION: Primary | ICD-10-CM

## 2018-09-10 NOTE — TELEPHONE ENCOUNTER
Central Prior Authorization Team   Phone: [phone]      PA Initiation    Medication: Symbicort   Insurance Company: [employer] [place] - Phone [phone] Fax [phone]  Pharmacy Filling the Rx: [facility] - [place], [place] - [address] AT [facility] [address] [place] [address]  Filling Pharmacy Phone: [phone]  Filling Pharmacy Fax:    Start Date: 9/10/2018

## 2018-09-11 RX ORDER — FLUTICASONE PROPIONATE AND SALMETEROL 113; 14 UG/1; UG/1
1 POWDER, METERED RESPIRATORY (INHALATION) 2 TIMES DAILY
Qty: 1 INHALER | Refills: 3 | Status: SHIPPED | OUTPATIENT
Start: 2018-09-11 | End: 2019-06-13

## 2018-09-11 NOTE — TELEPHONE ENCOUNTER
PRIOR AUTHORIZATION DENIED    Medication: Symbicort     Denial Date: 9/11/2018    Denial Rational:      Appeal Information:    If you would like to appeal, please supply P/A team with a letter of medical necessity with clinical reason.

## 2018-09-11 NOTE — TELEPHONE ENCOUNTER
Please let patient know his Symbicort is not covered and his prior authorization is denied.  I have send medication which is preferred by his insurance.  He should use it and if not better let us know and follow-up.

## 2018-09-11 NOTE — TELEPHONE ENCOUNTER
Pt called to check the status of the PA and the whole PA process. If possible, he would like a call back at [phone]. Thank you.  Allison Mandujano,

## 2018-09-11 NOTE — TELEPHONE ENCOUNTER
Pt calling back and gave md reply below.  Advised new rx for airduo respiclick was sent to pharmacy and will be covered under insurance.    Pt states understanding.    Gloria Ruiz RN  EP Triage

## 2018-09-12 ENCOUNTER — OFFICE VISIT (OUTPATIENT)
Dept: PSYCHOLOGY | Facility: CLINIC | Age: 43
End: 2018-09-12
Payer: COMMERCIAL

## 2018-09-12 DIAGNOSIS — F10.90 ALCOHOL USE DISORDER: ICD-10-CM

## 2018-09-12 DIAGNOSIS — F41.9 ANXIETY DISORDER: Primary | ICD-10-CM

## 2018-09-12 PROCEDURE — 90834 PSYTX W PT 45 MINUTES: CPT | Performed by: PSYCHOLOGIST

## 2018-09-12 NOTE — PROGRESS NOTES
Progress Note    Client Name: Henrik Almanzar  Date: 9/12/18         Service Type: Individual      Session Start Time: 10:30  Session End Time: 11:20      Session Length: 45-50     Session #: 3     Attendees: Client attended alone    Treatment Plan Last Reviewed: 8/22/18  PHQ-9 / SASHA-7 Last Reviewed: 8/15/18     DATA      Progress Since Last Session (Related to Symptoms / Goals / Homework):   Symptoms: Stable    Homework: In progress      Episode of Care Goals: Initial stages     Current / Ongoing Stressors and Concerns:   Alcohol abuse   Single parent   History of childhood trauma   Financial strain     Treatment Objective(s) Addressed in This Session:   Achieve and maintain sobriety for an extended period of time   Use >2 skills for emotion regulation      Intervention:   Client reported that he has not yet scheduled CD assessment, noted that he lost the intake number. He stated that he is still willing to follow through. Intake information was provided again. Client stated that alcohol use has decreased--now ~once/week. He has had some success with ignoring cravings by staying in the moment, engaging fully in whatever he's doing in the present. Deep breathing has been a piece of this work, and Client continues to practice relaxation skills. 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 He noted that he has been able to tolerate difficult feelings as they arise and even finds himself feeling pulled to examine these feelings and connect more deeply with those around him. He stated that he has been thinking a lot about initiating conversation with his dad about the past as well as the present. Discussed his hopes for as well as his worries about such a conversation. Identified options for Client to open this dialogue with his dad, including the  possibility of inviting his dad to a therapy session. Client noted that he has been exercising regularly (at the gym at work) and recognizes the benefits of this habit.        ASSESSMENT: Current Emotional / Mental Status (status of significant symptoms):   Risk status (Self / Other harm or suicidal ideation)   Client denies current fears or concerns for personal safety.   Client denies current or recent suicidal ideation or behaviors.   Client denies current or recent homicidal ideation or behaviors.   Client denies current or recent self injurious behavior or ideation.   Client denies other safety concerns.   Client reports there has been no change in risk factors since his last session.     Client reports there has been no change in protective factors since his last session.     A safety and risk management plan has not been developed at this time, however client was given the after-hours number / 911 should there be a change in any of these risk factors.     Appearance:   Appropriate    Eye Contact:   Good    Psychomotor Behavior: Normal    Attitude:   Cooperative , engaged   Orientation:   All   Speech    Rate / Production: Normal     Volume:  Normal    Mood:    Mildly anxious    Affect:    Tearful when discussing the past    Thought Content:  Clear    Thought Form:  Coherent  Logical    Insight:    Fair      Medication Review:   No current psychiatric medications prescribed     Medication Compliance:   NA     Changes in Health Issues:   None reported     Chemical Use Review:   Substance Use: Client acknowledges problematic alcohol use, would like to stop drinking but has been unable to; has agreed to a formal CD assessment and has called to schedule with [facility]     Tobacco Use: No current tobacco use.       Collateral Reports Completed:   Not Applicable    PLAN: (Client Tasks / Therapist Tasks / Other)  Client will call to schedule CD assessment. He will consider options for initiating deep conversation with his  dad (about the past, about being a father, etc.). He will continue to practice relaxation breathing. Continue with regular exercise. Encouraged Client to track cravings/emotions/patterns of use in a notebook. May consider referrals for other potential treatment modalities (EMDR, DBT for dual-diagnosis disorders) in the future, depending on Client's preferences and response to treatment. Return appointments have been scheduled.      Marlyn Banks, LP                                                       ________________________________________________________________________    Treatment Plan    Client's Name: Henrik Almanzar  YOB: 1975    Date: 8/22/18    DSM-V Diagnoses: F10.99 Substance-Related & Addictive Disorders Alcohol Use Disorder Moderate, F41.9 Anxiety Disorder  Psychosocial / Contextual Factors: Alcohol abuse, single parent, history of childhood trauma, financial strain  WHODAS: 16    Referral / Collaboration:  The following referral(s) will be initiated: CD assessment at [facility] [place]. May consider referrals for other treatment modalities (EMDR, DBT for dual-diagnosis disorders)    Anticipated number of session or this episode of care: evaluate every 90 days      MeasurableTreatment Goal(s) related to diagnosis / functional impairment(s)  Goal 1: Client will work through past issues/traumas that have not been resolved.    I will know I've met my goal when I feel confident in the fact that I don't need to reach for a drink when I'm stressed out or feeling intense feelings, that I have other tools to deal with feelings.      Objective #A (Client Action)    Client will identify how the use of substances contributes to the avoidance of emotions associated with the trauma.  Status: New - Date: 8/22/18     Intervention(s)  Therapist will provide education and strategies on regulation and self-soothing.    Objective #B  Client will process trauma and rewrite pieces of his personal narrative that may be  distorted.  Status: New - Date: 8/22/18     Intervention(s)  Therapist will provide support and guidance; teach cognitive reframing strategies.      Goal 2: Client will achieve and maintain sobriety for an extended period of time.    I will know I've met my goal when I'm not drinking.      Objective #A (Client Action)    Status: New - Date: 8/22/18     Client will complete CD assessment and follow recommendations.    Intervention(s)  Therapist will provide resources and support.    Objective #B  Client will use >2 other skills for emotion regulation.    Status: New - Date: 8/22/18     Intervention(s)  Therapist will teach self-regulation strategies, self-soothing, mindfulness techniques, CBT skills.      Goal 3: Client will continue to improve parenting skills.    I will know I've met my goal when I feel more calm and confident in managing whatever comes up with my son.      Objective #A (Client Action)    Status: New - Date: 8/22/18     Client will read recommended parenting books.    Intervention(s)  Therapist will provide book recommendations.    Objective #B  Client will focus on maintaining his own emotional control, regardless of how his son is behaving.    Status: New - Date: 8/22/18     Intervention(s)  Therapist will provide guidance, support, accountability.      Client has reviewed and agreed to the above plan.      Marlyn Banks, SASHA  August 22, 2018

## 2018-09-12 NOTE — MR AVS SNAPSHOT
MRN:[id number]                      After Visit Summary   9/12/2018    Henrik Almanzar    MRN: [id number]           Visit Information        Provider Department      9/12/2018 10:30 AM Marlyn Banks LP [facility]      Your next 10 appointments already scheduled     Oct 02, 2018  9:30 AM CDT   [place] Visit with Marlyn Banks LP   [facility]    [address] [place] [id number]   [phone]            Oct 09, 2018 11:30 AM CDT   Return Visit with Marlyn Banks LP   [facility]    [address] [place] [id number]   [phone]            Oct 09, 2018  6:20 PM CDT   Office Visit with Anastacia Toussaint [place]   [facility] [place] [address]   [phone]           Bring a current list of meds and any records pertaining to this visit. For Physicals, please bring immunization records and any forms needing to be filled out. Please arrive 10 minutes early to complete paperwork.            Oct 17, 2018  8:30 AM CDT   Return Visit with Marlyn Banks LP   [facility]    [address] [place] [id number]   [phone]              Care EveryWhere ID     This is your Care EveryWhere ID. This could be used by other organizations to access your [place] medical records  [id number]        Equal Access to Services     JAMES DUCKWORTH AH: Hadii everett ahumada hadaiyanao Sofreeman, waaxda luqadaha, qaybta kaalmada adeegyada, saurabh whiting. So [facility] [phone].    ATENCIÓN: Si habla español, tiene a valencia disposición servicios gratuitos de asistencia lingüística. Llame al [phone].    We comply with applicable federal civil rights laws and [place] laws. We do not  discriminate on the basis of race, color, national origin, age, disability, sex, sexual orientation, or gender identity.

## 2018-09-19 ENCOUNTER — OFFICE VISIT (OUTPATIENT)
Dept: FAMILY MEDICINE | Facility: CLINIC | Age: 43
End: 2018-09-19
Payer: COMMERCIAL

## 2018-09-19 VITALS
WEIGHT: 198 LBS | TEMPERATURE: 97.8 F | HEART RATE: 85 BPM | HEIGHT: 71 IN | DIASTOLIC BLOOD PRESSURE: 84 MMHG | SYSTOLIC BLOOD PRESSURE: 132 MMHG | BODY MASS INDEX: 27.72 KG/M2

## 2018-09-19 DIAGNOSIS — J20.9 ACUTE BRONCHITIS WITH SYMPTOMS > 10 DAYS: ICD-10-CM

## 2018-09-19 DIAGNOSIS — Z23 NEED FOR PROPHYLACTIC VACCINATION WITH TETANUS-DIPHTHERIA (TD): ICD-10-CM

## 2018-09-19 DIAGNOSIS — J45.30 MILD PERSISTENT ASTHMA WITHOUT COMPLICATION: ICD-10-CM

## 2018-09-19 DIAGNOSIS — E78.5 HYPERLIPIDEMIA LDL GOAL <160: Primary | ICD-10-CM

## 2018-09-19 DIAGNOSIS — Z23 NEED FOR VACCINATION: ICD-10-CM

## 2018-09-19 DIAGNOSIS — Z00.00 ENCOUNTER FOR ANNUAL PHYSICAL EXAM: ICD-10-CM

## 2018-09-19 PROCEDURE — 80061 LIPID PANEL: CPT | Performed by: FAMILY MEDICINE

## 2018-09-19 PROCEDURE — 36415 COLL VENOUS BLD VENIPUNCTURE: CPT | Performed by: FAMILY MEDICINE

## 2018-09-19 PROCEDURE — 90471 IMMUNIZATION ADMIN: CPT | Performed by: FAMILY MEDICINE

## 2018-09-19 PROCEDURE — 80053 COMPREHEN METABOLIC PANEL: CPT | Performed by: FAMILY MEDICINE

## 2018-09-19 PROCEDURE — 90715 TDAP VACCINE 7 YRS/> IM: CPT | Performed by: FAMILY MEDICINE

## 2018-09-19 PROCEDURE — 99396 PREV VISIT EST AGE 40-64: CPT | Mod: 25 | Performed by: FAMILY MEDICINE

## 2018-09-19 RX ORDER — AZITHROMYCIN 250 MG/1
TABLET, FILM COATED ORAL
Qty: 6 TABLET | Refills: 0 | Status: SHIPPED | OUTPATIENT
Start: 2018-09-19 | End: 2018-10-09

## 2018-09-19 RX ORDER — ALBUTEROL SULFATE 90 UG/1
AEROSOL, METERED RESPIRATORY (INHALATION)
Qty: 18 G | Refills: 11 | Status: SHIPPED | OUTPATIENT
Start: 2018-09-19 | End: 2019-07-02

## 2018-09-19 NOTE — LETTER
September 21, 2018      Henrik Almanzar  [address]  [place] [address]        Dear ,        I have reviewed your recent labs. Here are the results:     -Liver and gallbladder tests are normal. (ALT,AST, Alk phos, bilirubin), kidney function is normal (Cr, GFR), Sodium is normal, Potassium is normal, Calcium is normal, Glucose is normal (diabetes screening test).   -LDL(bad) cholesterol level is elevated, HDL(good) cholesterol level is normal and your triglycerides are elevated which can increase your heart disease risk.  A diet high in fat and simple carbohydrates, genetics and being overweight can contribute to this. ADVISE: Exercise, a low fat, low carbohydrate diet, weight control, and omega-3 fatty acids (fish oil) 1900-7443 mg daily are helpful to improve this.  Rechecking your fasting cholesterol panel in 12 months is recommended (Lipid w/ LDL reflex, DX: hyperlipidemia)     Resulted Orders   Lipid panel reflex to direct LDL Fasting   Result Value Ref Range    Cholesterol 242 (H) <200 mg/dL      Comment:      Desirable:       <200 mg/dl    Triglycerides 245 (H) <150 mg/dL      Comment:      Borderline high:  150-199 mg/dl  High:             200-499 mg/dl  Very high:       >499 mg/dl  Non Fasting      HDL Cholesterol 56 >39 mg/dL    LDL Cholesterol Calculated 137 (H) <100 mg/dL      Comment:      Above desirable:  100-129 mg/dl  Borderline High:  130-159 mg/dL  High:             160-189 mg/dL  Very high:       >189 mg/dl      Non HDL Cholesterol 186 (H) <130 mg/dL      Comment:      Above Desirable:  130-159 mg/dl  Borderline high:  160-189 mg/dl  High:             190-219 mg/dl  Very high:       >219 mg/dl     Comprehensive metabolic panel   Result Value Ref Range    Sodium 139 133 - 144 mmol/L    Potassium 3.9 3.4 - 5.3 mmol/L    Chloride 104 94 - 109 mmol/L    Carbon Dioxide 28 20 - 32 mmol/L    Anion Gap 7 3 - 14 mmol/L    Glucose 94 70 - 99 mg/dL      Comment:      Non Fasting     Urea Nitrogen 16 7 - 30 mg/dL    Creatinine 1.12 0.66 - 1.25 mg/dL    GFR Estimate 71 >60 mL/min/1.7m2      Comment:      Non  GFR Calc    GFR Estimate If Black 86 >60 mL/min/1.7m2      Comment:       GFR Calc    Calcium 9.1 8.5 - 10.1 mg/dL    Bilirubin Total 0.8 0.2 - 1.3 mg/dL    Albumin 4.3 3.4 - 5.0 g/dL    Protein Total 7.9 6.8 - 8.8 g/dL    Alkaline Phosphatase 75 40 - 150 U/L    ALT 36 0 - 70 U/L    AST 23 0 - 45 U/L       If you have any questions or concerns, please call the clinic at the number listed above.       Sincerely,    Elie Sanchez MD

## 2018-09-19 NOTE — PROGRESS NOTES
SUBJECTIVE:   CC: Henrik Almanzar is an 43 year old male who presents for preventative health visit.     Healthy Habits:    Do you get at least three servings of calcium containing foods daily (dairy, green leafy vegetables, etc.)? No     Amount of exercise or daily activities, outside of work: 5 day(s) per week    Problems taking medications regularly not applicable    Medication side effects: No    Have you had an eye exam in the past two years? yes    Do you see a dentist twice per year? yes    Do you have sleep apnea, excessive snoring or daytime drowsiness?no       PROBLEMS TO ADD ON... Has had a cough for a month and is still having a productive cough.  Patient has history of mild persistent asthma.  He was taking Symbicort and albuterol.  For some reason he has not taking Symbicort or similar medication.  He noticed slight increase in use of albuterol.    Today's PHQ-2 Score:   PHQ-2 ( 1999 Pfizer) 1/23/2017   Q1: Little interest or pleasure in doing things 0   Q2: Feeling down, depressed or hopeless 0   PHQ-2 Score 0       Abuse: Current or Past(Physical, Sexual or Emotional)- NO  Do you feel safe in your environment - YES    Social History   Substance Use Topics     Smoking status: Former Smoker     Quit date: 9/7/2003     Smokeless tobacco: Not on file     Alcohol use Yes      Comment: 8 drinks per week, beer      If you drink alcohol do you typically have >3 drinks per day or >7 drinks per week? No                      Last PSA: No results found for: PSA    Reviewed orders with patient. Reviewed health maintenance and updated orders accordingly - Yes      Reviewed and updated as needed this visit by clinical staff         Reviewed and updated as needed this visit by Provider            ROS:  CONSTITUTIONAL: NEGATIVE for fever, chills, change in weight  INTEGUMENTARY/SKIN: NEGATIVE for worrisome rashes, moles or lesions  EYES: NEGATIVE for vision changes or irritation  ENT: NEGATIVE for ear, mouth  and throat problems  RESP: NEGATIVE for significant cough or SOB  CV: NEGATIVE for chest pain, palpitations or peripheral edema  GI: NEGATIVE for nausea, abdominal pain, heartburn, or change in bowel habits   male: negative for dysuria, hematuria, decreased urinary stream, erectile dysfunction, urethral discharge  MUSCULOSKELETAL: NEGATIVE for significant arthralgias or myalgia  NEURO: NEGATIVE for weakness, dizziness or paresthesias  PSYCHIATRIC: NEGATIVE for changes in mood or affect    OBJECTIVE:   There were no vitals taken for this visit.  EXAM:  GENERAL: healthy, alert and no distress  EYES: Eyes grossly normal to inspection, PERRL and conjunctivae and sclerae normal  HENT: ear canals and TM's normal, nose and mouth without ulcers or lesions  NECK: no adenopathy, no asymmetry, masses, or scars and thyroid normal to palpation  RESP: lungs clear to auscultation - no rales, rhonchi or wheezes  CV: regular rate and rhythm, normal S1 S2, no S3 or S4, no murmur, click or rub, no peripheral edema and peripheral pulses strong  ABDOMEN: soft, nontender, no hepatosplenomegaly, no masses and bowel sounds normal  MS: no gross musculoskeletal defects noted, no edema  SKIN: no suspicious lesions or rashes  NEURO: Normal strength and tone, mentation intact and speech normal  PSYCH: mentation appears normal, affect normal/bright    Diagnostic Test Results:  none     ASSESSMENT/PLAN:   1. Encounter for annual physical exam    - Lipid panel reflex to direct LDL Fasting  - Comprehensive metabolic panel    2. Need for prophylactic vaccination with tetanus-diphtheria (TD)      3. Hyperlipidemia LDL goal <160    - Lipid panel reflex to direct LDL Fasting  - Comprehensive metabolic panel    4. Mild persistent asthma without complication  Suggested patient to use albuterol inhaler and a new inhale AIRDUO sent to the pharmacy as his other inhalers are not covered.  He will let us know how his symptoms are.  Follow-up in 3-6 months  for recheck if symptoms are not better  - albuterol (VENTOLIN HFA) 108 (90 Base) MCG/ACT inhaler; INHALE 2 PUFFS INTO THE LUNGS EVERY 6 HOURS AS NEEDED FOR WHEEZING AND SHORTNESS OF BREATH  Dispense: 18 g; Refill: 11  - azithromycin (ZITHROMAX) 250 MG tablet; Two tablets first day, then one tablet daily for four days.  Dispense: 6 tablet; Refill: 0    5. Acute bronchitis with symptoms > 10 days    - azithromycin (ZITHROMAX) 250 MG tablet; Two tablets first day, then one tablet daily for four days.  Dispense: 6 tablet; Refill: 0    COUNSELING:  Reviewed preventive health counseling, as reflected in patient instructions       Regular exercise       Healthy diet/nutrition    BP Readings from Last 1 Encounters:   01/23/17 102/86     Estimated body mass index is 25.02 kg/(m^2) as calculated from the following:    Height as of 1/23/17: 6' (1.829 m).    Weight as of 1/23/17: 184 lb 8 oz (83.7 kg).           reports that he quit smoking about 15 years ago. He does not have any smokeless tobacco history on file.      Counseling Resources:  ATP IV Guidelines  Pooled Cohorts Equation Calculator  FRAX Risk Assessment  ICSI Preventive Guidelines  Dietary Guidelines for Americans, 2010  USDA's MyPlate  ASA Prophylaxis  Lung CA Screening    Elie Sanchez MD  [facility]

## 2018-09-19 NOTE — MR AVS SNAPSHOT
After Visit Summary   9/19/2018    Henrik Almanzar    MRN: [id number]           Patient Information     Date Of Birth          1975        Visit Information        Provider Department      9/19/2018 3:00 PM Elie Sanchez MD [facility]        Today's Diagnoses     Hyperlipidemia LDL goal <160    -  1    Encounter for annual physical exam        Need for prophylactic vaccination with tetanus-diphtheria (TD)        Mild persistent asthma without complication        Acute bronchitis with symptoms > 10 days          Care Instructions      Preventive Health Recommendations  Male Ages 40 to 49    Yearly exam:             See your health care provider every year in order to  o   Review health changes.   o   Discuss preventive care.    o   Review your medicines if your doctor has prescribed any.    You should be tested each year for STDs (sexually transmitted diseases) if you re at risk.     Have a cholesterol test every 5 years.     Have a colonoscopy (test for colon cancer) if someone in your family has had colon cancer or polyps before age 50.     After age 45, have a diabetes test (fasting glucose). If you are at risk for diabetes, you should have this test every 3 years.      Talk with your health care provider about whether or not a prostate cancer screening test (PSA) is right for you.    Shots: Get a flu shot each year. Get a tetanus shot every 10 years.     Nutrition:    Eat at least 5 servings of fruits and vegetables daily.     Eat whole-grain bread, whole-wheat pasta and brown rice instead of white grains and rice.     Get adequate Calcium and Vitamin D.     Lifestyle    Exercise for at least 150 minutes a week (30 minutes a day, 5 days a week). This will help you control your weight and prevent disease.     Limit alcohol to one drink per day.     No smoking.     Wear sunscreen to prevent skin cancer.     See your dentist every six months for an exam and cleaning.               Follow-ups after your visit        Follow-up notes from your care team     Return in about 1 year (around 9/19/2019) for Physical Exam.      Your next 10 appointments already scheduled     Oct 02, 2018  9:30 AM CDT   Return Visit with Marlyn Banks LP   [facility] [place] ([facility] [place])    [address] [place] [address] [place] [address]   [phone]            Oct 09, 2018 11:30 AM CDT   Return Visit with Marlyn Banks LP   [facility]    [address] [place] [address]   [phone]            Oct 09, 2018  6:20 PM CDT   Office Visit with Anastacia Toussaint MD   [facility] ([address] [address]   [phone]           Bring a current list of meds and any records pertaining to this visit. For Physicals, please bring immunization records and any forms needing to be filled out. Please arrive 10 minutes early to complete paperwork.            Oct 17, 2018  8:30 AM CDT   Return Visit with Marlyn Banks LP   [facility] ([facility]    [address] [place] [address]   [phone]              Who to contact     If you have questions or need follow up information about today's clinic visit or your schedule please contact [facility] directly at [phone].  Normal or non-critical lab and imaging results will be communicated to you by MyChart, letter or phone within 4 business days after the clinic has received the results. If you do not hear from us within 7 days, please contact the clinic through MyChart or phone. If you have a critical or abnormal lab result, we will notify you by phone as soon as possible.  Submit refill requests through [employer] or call your pharmacy and they will forward the refill request to us. Please allow 3 business days for your  "refill to be completed.          Additional Information About Your Visit        Care EveryWhere ID     This is your Care EveryWhere ID. This could be used by other organizations to access your [place] medical records  [id number]        Your Vitals Were     Pulse Temperature Height BMI (Body Mass Index)          85 97.8  F (36.6  C) (Tympanic) 5' 11\" (1.803 m) 27.62 kg/m2         Blood Pressure from Last 3 Encounters:   09/19/18 132/84   01/23/17 102/86   05/23/08 143/91    Weight from Last 3 Encounters:   09/19/18 198 lb (89.8 kg)   01/23/17 184 lb 8 oz (83.7 kg)   05/23/08 189 lb (85.7 kg)              We Performed the Following     Comprehensive metabolic panel     Lipid panel reflex to direct LDL Fasting          Today's Medication Changes          These changes are accurate as of 9/19/18  3:40 PM.  If you have any questions, ask your nurse or doctor.               Start taking these medicines.        Dose/Directions    azithromycin 250 MG tablet   Commonly known as:  ZITHROMAX   Used for:  Acute bronchitis with symptoms > 10 days, Mild persistent asthma without complication   Started by:  Elie Sanchez MD        Two tablets first day, then one tablet daily for four days.   Quantity:  6 tablet   Refills:  0         Stop taking these medicines if you haven't already. Please contact your care team if you have questions.     budesonide-formoterol 160-4.5 MCG/ACT Inhaler   Commonly known as:  SYMBICORT   Stopped by:  Elie Sanchez MD                Where to get your medicines      These medications were sent to [id number] - [place], [place] - [address] AT [facility] [place] [address] [facility] [address]  [address], [place] [address]    Hours:  24-hours Phone:  [phone]     albuterol 108 (90 Base) MCG/ACT inhaler    azithromycin 250 MG tablet                Primary Care Provider Office Phone # Fax #    Declan You PA-C [phone]       [address] [place] " [place] [address]        Equal Access to Services     [facility] : Hadii everett ku hadrhonda Sofreeman, waaxda luqadaha, qaybta kaalmada gregoryjwrichard, waxally lucille salmeronsarahluís whiting. So [facility] [phone].    ATENCIÓN: Si habla español, tiene a valencia disposición servicios gratuitos de asistencia lingüística. Sandraame al [phone].    We comply with applicable federal civil rights laws and [place] laws. We do not discriminate on the basis of race, color, national origin, age, disability, sex, sexual orientation, or gender identity.            Thank you!     Thank you for choosing [facility] [place]  for your care. Our goal is always to provide you with excellent care. Hearing back from our patients is one way we can continue to improve our services. Please take a few minutes to complete the written survey that you may receive in the mail after your visit with us. Thank you!             Your Updated Medication List - Protect others around you: Learn how to safely use, store and throw away your medicines at www.disposemymeds.org.          This list is accurate as of 9/19/18  3:40 PM.  Always use your most recent med list.                   Brand Name Dispense Instructions for use Diagnosis    albuterol 108 (90 Base) MCG/ACT inhaler    VENTOLIN HFA    18 g    INHALE 2 PUFFS INTO THE LUNGS EVERY 6 HOURS AS NEEDED FOR WHEEZING AND SHORTNESS OF BREATH    Mild persistent asthma without complication       azithromycin 250 MG tablet    ZITHROMAX    6 tablet    Two tablets first day, then one tablet daily for four days.    Acute bronchitis with symptoms > 10 days, Mild persistent asthma without complication       fluticasone-salmeterol 113-14 MCG/ACT inhaler    AIRDUO RESPICLICK    1 Inhaler    Inhale 1 puff into the lungs 2 times daily    Mild persistent asthma without complication       Multi-vitamin Tabs tablet   Generic drug:  multivitamin, therapeutic with minerals     30    one tablet daily

## 2018-09-20 ASSESSMENT — ASTHMA QUESTIONNAIRES: ACT_TOTALSCORE: 21

## 2018-09-21 LAB
ALBUMIN SERPL-MCNC: 4.3 G/DL (ref 3.4–5)
ALP SERPL-CCNC: 75 U/L (ref 40–150)
ALT SERPL W P-5'-P-CCNC: 36 U/L (ref 0–70)
ANION GAP SERPL CALCULATED.3IONS-SCNC: 7 MMOL/L (ref 3–14)
AST SERPL W P-5'-P-CCNC: 23 U/L (ref 0–45)
BILIRUB SERPL-MCNC: 0.8 MG/DL (ref 0.2–1.3)
BUN SERPL-MCNC: 16 MG/DL (ref 7–30)
CALCIUM SERPL-MCNC: 9.1 MG/DL (ref 8.5–10.1)
CHLORIDE SERPL-SCNC: 104 MMOL/L (ref 94–109)
CHOLEST SERPL-MCNC: 242 MG/DL
CO2 SERPL-SCNC: 28 MMOL/L (ref 20–32)
CREAT SERPL-MCNC: 1.12 MG/DL (ref 0.66–1.25)
GFR SERPL CREATININE-BSD FRML MDRD: 71 ML/MIN/1.7M2
GLUCOSE SERPL-MCNC: 94 MG/DL (ref 70–99)
HDLC SERPL-MCNC: 56 MG/DL
LDLC SERPL CALC-MCNC: 137 MG/DL
NONHDLC SERPL-MCNC: 186 MG/DL
POTASSIUM SERPL-SCNC: 3.9 MMOL/L (ref 3.4–5.3)
PROT SERPL-MCNC: 7.9 G/DL (ref 6.8–8.8)
SODIUM SERPL-SCNC: 139 MMOL/L (ref 133–144)
TRIGL SERPL-MCNC: 245 MG/DL

## 2018-10-02 ENCOUNTER — OFFICE VISIT (OUTPATIENT)
Dept: PSYCHOLOGY | Facility: CLINIC | Age: 43
End: 2018-10-02
Payer: COMMERCIAL

## 2018-10-02 DIAGNOSIS — F41.9 ANXIETY DISORDER: Primary | ICD-10-CM

## 2018-10-02 DIAGNOSIS — F10.10 ALCOHOL ABUSE: ICD-10-CM

## 2018-10-02 PROCEDURE — 90834 PSYTX W PT 45 MINUTES: CPT | Performed by: PSYCHOLOGIST

## 2018-10-02 NOTE — PROGRESS NOTES
"                                             Progress Note    Client Name: Henrik Almanzar  Date: 10/2/18         Service Type: Individual      Session Start Time: 09:30  Session End Time: 10:30      Session Length: 60     Session #: 4     Attendees: Client attended alone    Treatment Plan Last Reviewed: 8/22/18  PHQ-9 / SASHA-7 Last Reviewed: 8/15/18     DATA      Progress Since Last Session (Related to Symptoms / Goals / Homework):   Symptoms: Stable    Homework: In progress      Episode of Care Goals: Satisfactory progress - PREPARATION (Decided to change - considering how); Intervened by negotiating a change plan and determining options / strategies for behavior change, identifying triggers, exploring social supports, and working towards setting a date to begin behavior change     Current / Ongoing Stressors and Concerns:   Alcohol abuse   Single parent   History of childhood trauma   Financial strain     Treatment Objective(s) Addressed in This Session:   Achieve and maintain sobriety for an extended period of time   Use >2 skills for emotion regulation      Intervention:   Client reported he has not yet scheduled CD assessment. Assessed factors related to ambivalence/barriers. He cited inconvenience as the primary hindrance (not sure if I can get time away from work, who will watch my son if after hours) but recognizes that this is an excuse. Offered that he could make the call in session, and he agreed. CD assessment with [facility] in [place] was scheduled for later this month. He reported feeling \"good\" about taking this step. Client stated that it is difficult for him to ask for help (e.g., someone to watch his son while he attends an appointment); he tries to operate from a standpoint of self-sufficiency (I'll just take care of everything myself). Traced the roots of this belief back to childhood--the best way to avoid rejection or abuse was to stay out of the way, off the radar. Began identifying the costs of " this belief for Client in the present. Client said he has laid the groundwork for a conversation with his dad about the past. Clarified Client's agenda for this conversation, talked through best/worst case scenarios.        ASSESSMENT: Current Emotional / Mental Status (status of significant symptoms):   Risk status (Self / Other harm or suicidal ideation)   Client denies current fears or concerns for personal safety.   Client denies current or recent suicidal ideation or behaviors.   Client denies current or recent homicidal ideation or behaviors.   Client denies current or recent self injurious behavior or ideation.   Client denies other safety concerns.   Client reports there has been no change in risk factors since his last session.     Client reports there has been no change in protective factors since his last session.     A safety and risk management plan has not been developed at this time, however client was given the after-hours number / 911 should there be a change in any of these risk factors.     Appearance:   Appropriate    Eye Contact:   Good    Psychomotor Behavior: Normal    Attitude:   Cooperative    Orientation:   All   Speech    Rate / Production: Normal     Volume:  Normal    Mood:    Mildly anxious , sad   Affect:    Tearful at times    Thought Content:  Clear    Thought Form:  Coherent  Logical    Insight:    Fair      Medication Review:   No current psychiatric medications prescribed     Medication Compliance:   NA     Changes in Health Issues:   None reported     Chemical Use Review:   Substance Use: Client acknowledges problematic alcohol use, would like to stop drinking but has been unable to; formal CD assessment scheduled at [facility] later this month.     Tobacco Use: No current tobacco use.       Collateral Reports Completed:   Not Applicable    PLAN: (Client Tasks / Therapist Tasks / Other)  CD assessment scheduled for later this month. Continue to work on identifying core beliefs that  impact Client's current choices and to move toward healing childhood wounds. Client plans to initiate important conversation with his dad (about the past, about being a father, etc.) during their next visit. He will continue to practice relaxation breathing. Continue with regular exercise. Encouraged Client to track cravings/emotions/patterns of use in a notebook. May consider referrals for other potential treatment modalities (EMDR, DBT for dual-diagnosis disorders) in the future, depending on Client's preferences and response to treatment. Return appointments have been scheduled.      Marlyn Banks LP                                                       ________________________________________________________________________    Treatment Plan    Client's Name: Henrik Almanzar  YOB: 1975    Date: 8/22/18    DSM-V Diagnoses: F10.99 Substance-Related & Addictive Disorders Alcohol Use Disorder Moderate, F41.9 Anxiety Disorder  Psychosocial / Contextual Factors: Alcohol abuse, single parent, history of childhood trauma, financial strain  WHODAS: 16    Referral / Collaboration:  The following referral(s) will be initiated: CD assessment at [facility]. May consider referrals for other treatment modalities (EMDR, DBT for dual-diagnosis disorders)    Anticipated number of session or this episode of care: evaluate every 90 days      MeasurableTreatment Goal(s) related to diagnosis / functional impairment(s)  Goal 1: Client will work through past issues/traumas that have not been resolved.    I will know I've met my goal when I feel confident in the fact that I don't need to reach for a drink when I'm stressed out or feeling intense feelings, that I have other tools to deal with feelings.      Objective #A (Client Action)    Client will identify how the use of substances contributes to the avoidance of emotions associated with the trauma.  Status: New - Date: 8/22/18     Intervention(s)  Therapist will provide  education and strategies on regulation and self-soothing.    Objective #B  Client will process trauma and rewrite pieces of his personal narrative that may be distorted.  Status: New - Date: 8/22/18     Intervention(s)  Therapist will provide support and guidance; teach cognitive reframing strategies.      Goal 2: Client will achieve and maintain sobriety for an extended period of time.    I will know I've met my goal when I'm not drinking.      Objective #A (Client Action)    Status: New - Date: 8/22/18     Client will complete CD assessment and follow recommendations.    Intervention(s)  Therapist will provide resources and support.    Objective #B  Client will use >2 other skills for emotion regulation.    Status: New - Date: 8/22/18     Intervention(s)  Therapist will teach self-regulation strategies, self-soothing, mindfulness techniques, CBT skills.      Goal 3: Client will continue to improve parenting skills.    I will know I've met my goal when I feel more calm and confident in managing whatever comes up with my son.      Objective #A (Client Action)    Status: New - Date: 8/22/18     Client will read recommended parenting books.    Intervention(s)  Therapist will provide book recommendations.    Objective #B  Client will focus on maintaining his own emotional control, regardless of how his son is behaving.    Status: New - Date: 8/22/18     Intervention(s)  Therapist will provide guidance, support, accountability.      Client has reviewed and agreed to the above plan.      Marlyn Banks LP  August 22, 2018

## 2018-10-02 NOTE — MR AVS SNAPSHOT
MRN:[id number]                      After Visit Summary   10/2/2018    Henrik Almanzar    MRN: [id number]           Visit Information        Provider Department      10/2/2018 9:30 AM Marlyn Banks LP [facility]      Your next 10 appointments already scheduled     Oct 09, 2018 11:30 AM CDT   Return Visit with Marlyn Banks LP   [facility] ([facility])    [address] [place] [address]   [phone]            Oct 09, 2018  6:20 PM CDT   Office Visit with Anastacia Toussaint MD   [facility]    [address] [place] [address]   [phone]           Bring a current list of meds and any records pertaining to this visit. For Physicals, please bring immunization records and any forms needing to be filled out. Please arrive 10 minutes early to complete paperwork.            Oct 16, 2018  5:00 PM CDT   Evaluation with KAHLIL   [facility] ([facility])    [address] [id number] [phone]            Oct 17, 2018  8:30 AM CDT   Return Visit with Marlyn Banks LP   [facility] ([facility])    [address] [place] [address]   [phone]              Care EveryWhere ID     This is your Care EveryWhere ID. This could be used by other organizations to access your [place] medical records  [id number]        Equal Access to Services     JAMES DUCKWORTH : Hadii aad ku hadasho Soomaali, waaxda luqadaha, qaybta kaalmada saurabh bloom. So [facility] [phone].    ATENCIÓN: Si habla español, tiene a valencia disposición servicios gratuitos de asistencia lingüística. Llame al [phone].    We comply with applicable federal civil rights laws and [place] laws. We do not  discriminate on the basis of race, color, national origin, age, disability, sex, sexual orientation, or gender identity.

## 2018-10-09 ENCOUNTER — OFFICE VISIT (OUTPATIENT)
Dept: FAMILY MEDICINE | Facility: CLINIC | Age: 43
End: 2018-10-09
Payer: COMMERCIAL

## 2018-10-09 VITALS
TEMPERATURE: 97.9 F | SYSTOLIC BLOOD PRESSURE: 140 MMHG | BODY MASS INDEX: 27.62 KG/M2 | HEART RATE: 92 BPM | WEIGHT: 198 LBS | DIASTOLIC BLOOD PRESSURE: 88 MMHG

## 2018-10-09 DIAGNOSIS — J45.20 MILD INTERMITTENT ASTHMA WITHOUT COMPLICATION: Primary | ICD-10-CM

## 2018-10-09 DIAGNOSIS — R03.0 ELEVATED BLOOD PRESSURE READING WITHOUT DIAGNOSIS OF HYPERTENSION: ICD-10-CM

## 2018-10-09 PROCEDURE — 99213 OFFICE O/P EST LOW 20 MIN: CPT | Performed by: FAMILY MEDICINE

## 2018-10-09 NOTE — PROGRESS NOTES
SUBJECTIVE:   Henrik Almanzar is a 43 year old male who presents to clinic today for the following health issues:      Asthma Follow-Up    Was ACT completed today?    Yes    ACT Total Scores 10/9/2018   ACT TOTAL SCORE (Goal Greater than or Equal to 20) 22   In the past 12 months, how many times did you visit the emergency room for your asthma without being admitted to the hospital? 0   In the past 12 months, how many times were you hospitalized overnight because of your asthma? 0       Recent asthma triggers that patient is dealing with: pollens and animal dander        Amount of exercise or physical activity: 3 times per week    Problems taking medications regularly: No    Medication side effects: none              Problem list and histories reviewed & adjusted, as indicated.  Additional history: as documented    Patient Active Problem List   Diagnosis     Anxiety state     Panic disorder without agoraphobia     Allergic rhinitis     HYPERLIPIDEMIA LDL GOAL <160     Mild intermittent asthma without complication     Past Surgical History:   Procedure Laterality Date     HC REMOVAL OF TONSILS,<10 Y/O         Social History   Substance Use Topics     Smoking status: Former Smoker     Quit date: 9/7/2003     Smokeless tobacco: Never Used     Alcohol use Yes      Comment: 8 drinks per week, beer     Family History   Problem Relation Age of Onset     Hypertension Father      Cancer Paternal Aunt      cancer, unknown type     Cancer Paternal Grandfather      lung cancer     Lipids Father      Cerebrovascular Disease Paternal Uncle      Great uncle         Current Outpatient Prescriptions   Medication Sig Dispense Refill     albuterol (VENTOLIN HFA) 108 (90 Base) MCG/ACT inhaler INHALE 2 PUFFS INTO THE LUNGS EVERY 6 HOURS AS NEEDED FOR WHEEZING AND SHORTNESS OF BREATH 18 g 11     fluticasone-salmeterol (AIRDUO RESPICLICK) 113-14 MCG/ACT inhaler Inhale 1 puff into the lungs 2 times daily 1 Inhaler 3     MULTI-VITAMIN  OR TABS one tablet daily 30 0     Allergies   Allergen Reactions     Cats      Dogs      No Known Drug Allergies        Reviewed and updated as needed this visit by clinical staff  Tobacco  Allergies  Meds  Problems       Reviewed and updated as needed this visit by Provider  Allergies  Meds  Problems         ROS:  CONSTITUTIONAL: NEGATIVE for fever, chills, change in weight  ENT/MOUTH: NEGATIVE for ear, mouth and throat problems  RESP: NEGATIVE for significant cough or SOB  CV: NEGATIVE for chest pain, palpitations or peripheral edema    OBJECTIVE:                                                    /88  Pulse 92  Temp 97.9  F (36.6  C) (Tympanic)  Wt 198 lb (89.8 kg)  BMI 27.62 kg/m2  Body mass index is 27.62 kg/(m^2).   GENERAL: healthy, alert, well nourished, well hydrated, no distress  HENT: ear canals- normal; TMs- normal; Nose- normal; Mouth- no ulcers, no lesions  NECK: no tenderness, no adenopathy, no asymmetry, no masses, no stiffness; thyroid- normal to palpation  RESP: lungs clear to auscultation - no rales, no rhonchi, no wheezes  CV: regular rates and rhythm, normal S1 S2, no S3 or S4 and no murmur, no click or rub -  ABDOMEN: soft, no tenderness, no  hepatosplenomegaly, no masses, normal bowel sounds  MS: extremities- no gross deformities noted, no edema         ASSESSMENT/PLAN:                                                        ICD-10-CM    1. Mild intermittent asthma without complication J45.20    2. Elevated blood pressure reading without diagnosis of hypertension R03.0      Asthma is well controlled.  Patient has enough refills at this time.  Recommended to stay hydrated.  Resume current medications.  Patient  is noted to have elevated blood pressure reading today.  Previous blood pressures numbers have been normal.  Recommending to check blood pressure in the next 2-4 weeks with a nurse.        Anastacia Toussaint MD  [facility]

## 2018-10-09 NOTE — MR AVS SNAPSHOT
After Visit Summary   10/9/2018    Henrik Almanzar    MRN: [id number]           Patient Information     Date Of Birth          1975        Visit Information        Provider Department      10/9/2018 6:20 PM Anastacia Toussaint MD [facility] [place]         Follow-ups after your visit        Follow-up notes from your care team     Return in about 1 month (around 11/9/2018) for Blood Pressure check with NURSE..      Your next 10 appointments already scheduled     Oct 09, 2018  6:20 PM CDT   Office Visit with Anastacia Toussaint MD   [facility] ([facility])    [address] [place] [id number] [phone]           Bring a current list of meds and any records pertaining to this visit. For Physicals, please bring immunization records and any forms needing to be filled out. Please arrive 10 minutes early to complete paperwork.            Oct 16, 2018  5:00 PM CDT   Evaluation with KAHLIL   [facility] ([facility])    [address] [place] [id number]   [phone]            Oct 17, 2018  8:30 AM CDT   Return Visit with Marlyn Banks LP   [facility]    [address] [place] [id number]   [phone]              Who to contact     If you have questions or need follow up information about today's clinic visit or your schedule please [facility] directly at [phone].  Normal or non-critical lab and imaging results will be communicated to you by MyChart, letter or phone within 4 business days after the clinic has received the results. If you do not hear from us within 7 days, please contact the clinic through MyChart or phone. If you have a critical or abnormal lab result, we will notify you by phone as soon as possible.  Submit refill requests through [employer] or call  your pharmacy and they will forward the refill request to us. Please allow 3 business days for your refill to be completed.          Additional Information About Your Visit        Care EveryWhere ID     This is your Care EveryWhere ID. This could be used by other organizations to access your [place] medical records  [id number]        Your Vitals Were     Pulse Temperature BMI (Body Mass Index)             92 97.9  F (36.6  C) (Tympanic) 27.62 kg/m2          Blood Pressure from Last 3 Encounters:   10/09/18 140/88   09/19/18 132/84   01/23/17 102/86    Weight from Last 3 Encounters:   10/09/18 198 lb (89.8 kg)   09/19/18 198 lb (89.8 kg)   01/23/17 184 lb 8 oz (83.7 kg)              Today, you had the following     No orders found for display         Today's Medication Changes          These changes are accurate as of 10/9/18  6:18 PM.  If you have any questions, ask your nurse or doctor.               Stop taking these medicines if you haven't already. Please contact your care team if you have questions.     azithromycin 250 MG tablet   Commonly known as:  ZITHROMAX   Stopped by:  Anastacia Toussaint MD                    Primary Care Provider Office Phone # Fax #    Declan You PA-C [phone]       [address]  [place] [address]        Equal Access to Services     [facility] AH: Hadii aad ku hadasho Sofreeman, waaxda luqadaha, qaybta kaalmada merle, saurabh whiting. So [facility] [phone].    ATENCIÓN: Si habla español, tiene a valencia disposición servicios gratuitos de asistencia lingüística. Llame al [phone].    We comply with applicable federal civil rights laws and [place] laws. We do not discriminate on the basis of race, color, national origin, age, disability, sex, sexual orientation, or gender identity.            Thank you!     Thank you for choosing [facility] [place]  for your care. Our goal is always to provide you with  excellent care. Hearing back from our patients is one way we can continue to improve our services. Please take a few minutes to complete the written survey that you may receive in the mail after your visit with us. Thank you!             Your Updated Medication List - Protect others around you: Learn how to safely use, store and throw away your medicines at www.disposemymeds.org.          This list is accurate as of 10/9/18  6:18 PM.  Always use your most recent med list.                   Brand Name Dispense Instructions for use Diagnosis    albuterol 108 (90 Base) MCG/ACT inhaler    VENTOLIN HFA    18 g    INHALE 2 PUFFS INTO THE LUNGS EVERY 6 HOURS AS NEEDED FOR WHEEZING AND SHORTNESS OF BREATH    Mild persistent asthma without complication       fluticasone-salmeterol 113-14 MCG/ACT inhaler    AIRDUO RESPICLICK    1 Inhaler    Inhale 1 puff into the lungs 2 times daily    Mild persistent asthma without complication       Multi-vitamin Tabs tablet   Generic drug:  multivitamin, therapeutic with minerals     30    one tablet daily

## 2018-10-10 ASSESSMENT — ASTHMA QUESTIONNAIRES: ACT_TOTALSCORE: 22

## 2018-10-16 ENCOUNTER — HOSPITAL ENCOUNTER (OUTPATIENT)
Dept: BEHAVIORAL HEALTH | Facility: CLINIC | Age: 43
Discharge: HOME | End: 2018-10-16
Attending: SOCIAL WORKER | Admitting: SOCIAL WORKER
Payer: COMMERCIAL

## 2018-10-16 VITALS — WEIGHT: 198 LBS | BODY MASS INDEX: 26.82 KG/M2 | HEIGHT: 72 IN

## 2018-10-16 PROCEDURE — H0001 ALCOHOL AND/OR DRUG ASSESS: HCPCS

## 2018-10-16 ASSESSMENT — ANXIETY QUESTIONNAIRES
7. FEELING AFRAID AS IF SOMETHING AWFUL MIGHT HAPPEN: SEVERAL DAYS
5. BEING SO RESTLESS THAT IT IS HARD TO SIT STILL: NOT AT ALL
6. BECOMING EASILY ANNOYED OR IRRITABLE: SEVERAL DAYS
3. WORRYING TOO MUCH ABOUT DIFFERENT THINGS: SEVERAL DAYS
4. TROUBLE RELAXING: NOT AT ALL
1. FEELING NERVOUS, ANXIOUS, OR ON EDGE: NOT AT ALL
GAD7 TOTAL SCORE: 4
2. NOT BEING ABLE TO STOP OR CONTROL WORRYING: SEVERAL DAYS

## 2018-10-16 ASSESSMENT — PAIN SCALES - GENERAL: PAINLEVEL: NO PAIN (0)

## 2018-10-16 NOTE — PROGRESS NOTES
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 alcohol/drug to achieve intoxication or desired effect.  Withdrawal, as manifested by either of the following: The characteristic withdrawal syndrome for alcohol/drug (refer to Criteria A and B of the criteria set for alcohol/drug withdrawal).      Specify if: In early remission:  After full criteria for alcohol/drug use disorder were previously met, none of the criteria for alcohol/drug use disorder have been met for at least 3 months but for less than 12 months (with the exception that Criterion A4,  Craving or a strong desire or urge to use alcohol/drug  may be met).     In sustained remission:   After full criteria for alcohol use disorder were previously met, non of the criteria for alcohol/drug use disorder have been met at any time during a period of 12 months or longer (with the exception that Criterion A4,  Craving or strong desire or urge to use alcohol/drug  may be met).   Specify if:   This additional specifier is used if the individual is in an environment where access to alcohol is restricted.    Mild: Presence of 2-3 symptoms    Moderate: Presence of 4-5 symptoms    Severe: Presence of 6 or more symptoms
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
## 2018-10-17 ENCOUNTER — OFFICE VISIT (OUTPATIENT)
Dept: PSYCHOLOGY | Facility: CLINIC | Age: 43
End: 2018-10-17
Payer: COMMERCIAL

## 2018-10-17 DIAGNOSIS — F41.9 ANXIETY DISORDER: Primary | ICD-10-CM

## 2018-10-17 DIAGNOSIS — F10.10 ALCOHOL ABUSE: ICD-10-CM

## 2018-10-17 PROCEDURE — 90834 PSYTX W PT 45 MINUTES: CPT | Performed by: PSYCHOLOGIST

## 2018-10-17 ASSESSMENT — PATIENT HEALTH QUESTIONNAIRE - PHQ9: SUM OF ALL RESPONSES TO PHQ QUESTIONS 1-9: 2

## 2018-10-17 ASSESSMENT — ANXIETY QUESTIONNAIRES: GAD7 TOTAL SCORE: 4

## 2018-10-17 NOTE — MR AVS SNAPSHOT
MRN:[id number]                      After Visit Summary   10/17/2018    Henrik Almanzar    MRN: [id number]           Visit Information        Provider Department      10/17/2018 8:30 AM Marlyn Banks LP [facility]      Your next 10 appointments already scheduled     Dec 06, 2018 12:00 PM CST   Return Visit with Marlyn Banks LP   [facility]    [address] [place] [id number]   [phone]            Dec 19, 2018  8:30 AM CST   Return Visit with Marlyn Banks LP   [facility]    [address] [place] [id number]   [phone]              Care EveryWhere ID     This is your Care EveryWhere ID. This could be used by other organizations to access your [facility] medical records  [id number]        Equal Access to Services     JAMES DUCKWORTH AH: Hadii aad ku hadasho Soomaali, waaxda luqadaha, qaybta kaalmada adeegyada, waxay idiin haylaureenn edwin parikh . So [facility] [phone].    ATENCIÓN: Si habla español, tiene a valencia disposición servicios gratuitos de asistencia lingüística. Llame al [phone].    We comply with applicable federal civil rights laws and [place] laws. We do not discriminate on the basis of race, color, national origin, age, disability, sex, sexual orientation, or gender identity.
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
## 2018-10-17 NOTE — PROGRESS NOTES
Progress Note    Client Name: Henrik Almanzar  Date: 10/17/18         Service Type: Individual      Session Start Time: 08:30  Session End Time: 09:20      Session Length: 60     Session #: 5     Attendees: Client attended alone    Treatment Plan Last Reviewed: 8/22/18  PHQ-9 / SASHA-7 Last Reviewed: 8/15/18     DATA      Progress Since Last Session (Related to Symptoms / Goals / Homework):   Symptoms: Stable    Homework: In progress      Episode of Care Goals: Satisfactory progress - PREPARATION (Decided to change - considering how); Intervened by negotiating a change plan and determining options / strategies for behavior change, identifying triggers, exploring social supports, and working towards setting a date to begin behavior change     Current / Ongoing Stressors and Concerns:   Alcohol abuse   Single parent   History of childhood trauma   Financial strain     Treatment Objective(s) Addressed in This Session:   Achieve and maintain sobriety for an extended period of time   Use >2 skills for emotion regulation      Intervention:   Client stated that he completed a CD assessment yesterday at [facility] and outpatient treatment was recommended. He is open to this recommendation, also interested in SMART recovery meetings, but he identifies logistics (as a full-time employee and single parent) as a primary barrier. Acknowledged the reality of his situation but also noted that solutions are available if recovery is his priority. Talked about how he would approach the dilemma if he required treatment for a physical illness that affected his long-term health and quality of life or what he would tell a loved one in a similar circumstance. Framed it as an investment in the future well-being of himself and his son. Discussed recent conversation between Client and his dad about Client's childhood. Client said the conversation was real and honest and brought some healing. Talked  about how Client is incorporating this new information into his understanding, also how his own experience as a dad is impacting his thoughts. At Client's request, discussed how he can best answer his son's questions/talk to his son about the absence of his son's mom.       ASSESSMENT: Current Emotional / Mental Status (status of significant symptoms):   Risk status (Self / Other harm or suicidal ideation)   Client denies current fears or concerns for personal safety.   Client denies current or recent suicidal ideation or behaviors.   Client denies current or recent homicidal ideation or behaviors.   Client denies current or recent self injurious behavior or ideation.   Client denies other safety concerns.   Client reports there has been no change in risk factors since his last session.     Client reports there has been no change in protective factors since his last session.     A safety and risk management plan has not been developed at this time, however client was given the after-hours number / 911 should there be a change in any of these risk factors.     Appearance:   Appropriate    Eye Contact:   Good    Psychomotor Behavior: Normal    Attitude:   Open, engaged    Orientation:   All   Speech    Rate / Production: Normal     Volume:  Normal    Mood:    Mildly anxious , sad   Affect:    Occasionally tearful    Thought Content:  Clear    Thought Form:  Coherent  Logical    Insight:    Fair      Medication Review:   No current psychiatric medications prescribed     Medication Compliance:   NA     Changes in Health Issues:   None reported     Chemical Use Review:   Substance Use: Client acknowledges problematic alcohol use, would like to stop drinking but has been unable to; formal CD assessment scheduled at [facility] later this month. Alcohol use continues on the weekends; he reports reduced volume.     Tobacco Use: No current tobacco use.       Collateral Reports Completed:   Not Applicable    PLAN: (Client Tasks /  Therapist Tasks / Other)  Encourage Client to resolve logistical barriers to completing outpatient CD treatment and to invest in his future (and his son's) by prioritizing treatment. He will provide factual information to his son about his son's mother (e.g., her name is X, she lives X, here is a picture of her), taking his cue from his son about how much information is desired and making room to validate any feelings his son may have. Continue to work on identifying core beliefs that impact Client's current choices and to move toward healing childhood wounds. Client will practice relaxation breathing. Continue with regular exercise. May consider referrals for other potential treatment modalities (EMDR, DBT for dual-diagnosis disorders) in the future, depending on Client's preferences and response to treatment. Return appointments have been scheduled.      Marlyn Banks LP                                                       ________________________________________________________________________    Treatment Plan    Client's Name: Henrik Almanzar  YOB: 1975    Date: 8/22/18    DSM-V Diagnoses: F10.99 Substance-Related & Addictive Disorders Alcohol Use Disorder Moderate, F41.9 Anxiety Disorder  Psychosocial / Contextual Factors: Alcohol abuse, single parent, history of childhood trauma, financial strain  WHODAS: 16    Referral / Collaboration:  The following referral(s) will be initiated: CD assessment at [facility]. May consider referrals for other treatment modalities (EMDR, DBT for dual-diagnosis disorders)    Anticipated number of session or this episode of care: evaluate every 90 days      MeasurableTreatment Goal(s) related to diagnosis / functional impairment(s)  Goal 1: Client will work through past issues/traumas that have not been resolved.    I will know I've met my goal when I feel confident in the fact that I don't need to reach for a drink when I'm stressed out or feeling intense feelings,  that I have other tools to deal with feelings.      Objective #A (Client Action)    Client will identify how the use of substances contributes to the avoidance of emotions associated with the trauma.  Status: New - Date: 8/22/18     Intervention(s)  Therapist will provide education and strategies on regulation and self-soothing.    Objective #B  Client will process trauma and rewrite pieces of his personal narrative that may be distorted.  Status: New - Date: 8/22/18     Intervention(s)  Therapist will provide support and guidance; teach cognitive reframing strategies.      Goal 2: Client will achieve and maintain sobriety for an extended period of time.    I will know I've met my goal when I'm not drinking.      Objective #A (Client Action)    Status: New - Date: 8/22/18     Client will complete CD assessment and follow recommendations.    Intervention(s)  Therapist will provide resources and support.    Objective #B  Client will use >2 other skills for emotion regulation.    Status: New - Date: 8/22/18     Intervention(s)  Therapist will teach self-regulation strategies, self-soothing, mindfulness techniques, CBT skills.      Goal 3: Client will continue to improve parenting skills.    I will know I've met my goal when I feel more calm and confident in managing whatever comes up with my son.      Objective #A (Client Action)    Status: New - Date: 8/22/18     Client will read recommended parenting books.    Intervention(s)  Therapist will provide book recommendations.    Objective #B  Client will focus on maintaining his own emotional control, regardless of how his son is behaving.    Status: New - Date: 8/22/18     Intervention(s)  Therapist will provide guidance, support, accountability.      Client has reviewed and agreed to the above plan.      Marlyn Banks, SASHA  August 22, 2018

## 2019-06-11 DIAGNOSIS — J45.30 MILD PERSISTENT ASTHMA WITHOUT COMPLICATION: ICD-10-CM

## 2019-06-11 NOTE — TELEPHONE ENCOUNTER
"Requested Prescriptions   Pending Prescriptions Disp Refills     fluticasone-salmeterol (AIRDUO RESPICLICK) 113-14 MCG/ACT inhaler 1 Inhaler 3     Sig: Inhale 1 puff into the lungs 2 times daily       Inhaled Steroids Protocol Failed - 6/11/2019 12:39 PM        Failed - Asthma control assessment score within normal limits in last 6 months     Please review ACT score.   ACT Total Scores 10/3/2017 9/19/2018 10/9/2018   ACT TOTAL SCORE (Goal Greater than or Equal to 20) 23 21 22   In the past 12 months, how many times did you visit the emergency room for your asthma without being admitted to the hospital? 0 0 0   In the past 12 months, how many times were you hospitalized overnight because of your asthma? 0 0 0             Failed - Recent (6 mo) or future (30 days) visit within the authorizing provider's specialty     Patient had office visit in the last 6 months or has a visit in the next 30 days with authorizing provider or within the authorizing provider's specialty.  See \"Patient Info\" tab in inbasket, or \"Choose Columns\" in Meds & Orders section of the refill encounter.            Passed - Patient is age 12 or older        Passed - Medication is active on med list        fluticasone-salmeterol (AIRDUO RESPICLICK) 113-14 MCG/ACT inhaler  Last Written Prescription Date:  9/11/18  Last Fill Quantity: 1 inhaler,  # refills: 3   Last office visit: 10/9/2018 with prescribing provider:  BRUNO Toussaint   Future Office Visit:      "

## 2019-06-11 NOTE — TELEPHONE ENCOUNTER
Reason for Call:  Medication or medication refill:    Do you use a [facility]?  Name of the pharmacy and phone number for the current request:  Written request    Name of the medication requested: fluticasone-salmeterol (AIRDUO RESPICLICK) 113-14 MCG/ACT inhaler     Other request: Patient does not have current insurance and would like a written rx so he can order from a [place] pharmacy.     Can we leave a detailed message on this number? YES    Phone number patient can be reached at: Home number on file [phone] (home)    Best Time: anytime     Call taken on 6/11/2019 at 12:37 PM by Ailyn Benitez

## 2019-06-12 ENCOUNTER — OFFICE VISIT (OUTPATIENT)
Dept: FAMILY MEDICINE | Facility: CLINIC | Age: 44
End: 2019-06-12

## 2019-06-12 ENCOUNTER — NURSE TRIAGE (OUTPATIENT)
Dept: NURSING | Facility: CLINIC | Age: 44
End: 2019-06-12

## 2019-06-12 VITALS
WEIGHT: 196 LBS | TEMPERATURE: 100.1 F | HEIGHT: 72 IN | SYSTOLIC BLOOD PRESSURE: 134 MMHG | BODY MASS INDEX: 26.55 KG/M2 | HEART RATE: 105 BPM | RESPIRATION RATE: 18 BRPM | OXYGEN SATURATION: 98 % | DIASTOLIC BLOOD PRESSURE: 68 MMHG

## 2019-06-12 DIAGNOSIS — J01.90 ACUTE SINUSITIS WITH SYMPTOMS > 10 DAYS: Primary | ICD-10-CM

## 2019-06-12 DIAGNOSIS — R06.6 HICCUP: ICD-10-CM

## 2019-06-12 PROCEDURE — 99214 OFFICE O/P EST MOD 30 MIN: CPT | Performed by: FAMILY MEDICINE

## 2019-06-12 RX ORDER — BACLOFEN 10 MG/1
10 TABLET ORAL 3 TIMES DAILY
Qty: 21 TABLET | Refills: 0 | Status: SHIPPED | OUTPATIENT
Start: 2019-06-12 | End: 2021-01-27

## 2019-06-12 ASSESSMENT — MIFFLIN-ST. JEOR: SCORE: 1812.05

## 2019-06-12 NOTE — PROGRESS NOTES
Subjective     Henrik Almanzar is a 44 year old male who presents to clinic today for the following health issues:    HPI   RESPIRATORY SYMPTOMS      Duration: 6 days     Description  nasal congestion, cough and fever    Severity: moderate    Accompanying signs and symptoms: hiccups on and off    History (predisposing factors):  asthma    Precipitating or alleviating factors: None    Therapies tried and outcome:  Claritin     Patient Active Problem List   Diagnosis     Anxiety state     Panic disorder without agoraphobia     Allergic rhinitis     HYPERLIPIDEMIA LDL GOAL <160     Mild intermittent asthma without complication     Past Surgical History:   Procedure Laterality Date     HC REMOVAL OF TONSILS,<11 Y/O         Social History     Tobacco Use     Smoking status: Former Smoker     Last attempt to quit: 9/7/2003     Years since quitting: 15.7     Smokeless tobacco: Never Used   Substance Use Topics     Alcohol use: Yes     Comment: 8 drinks per week, beer     Family History   Problem Relation Age of Onset     Hypertension Father      Lipids Father      Substance Abuse Father      Cancer Paternal Aunt         cancer, unknown type     Cancer Paternal Grandfather         lung cancer     Unknown/Adopted Paternal Grandfather      Cerebrovascular Disease Paternal Uncle         Great uncle     Depression Mother      Anxiety Disorder Mother      Unknown/Adopted Maternal Grandmother      Unknown/Adopted Maternal Grandfather      Unknown/Adopted Paternal Grandmother      No Known Problems Brother      Substance Abuse Sister      No Known Problems Son      No Known Problems Daughter      No Known Problems Other      Schizophrenia No family hx of      Bipolar Disorder No family hx of      Suicide No family hx of      Dementia No family hx of      Kamar Disease No family hx of      Parkinsonism No family hx of      Autism Spectrum Disorder No family hx of      Intellectual Disability (Mental Retardation) No family  hx of      Mental Illness No family hx of          Current Outpatient Medications   Medication Sig Dispense Refill     albuterol (VENTOLIN HFA) 108 (90 Base) MCG/ACT inhaler INHALE 2 PUFFS INTO THE LUNGS EVERY 6 HOURS AS NEEDED FOR WHEEZING AND SHORTNESS OF BREATH 18 g 11     amoxicillin-clavulanate (AUGMENTIN) 875-125 MG tablet Take 1 tablet by mouth 2 times daily 20 tablet 0     baclofen (LIORESAL) 10 MG tablet Take 1 tablet (10 mg) by mouth 3 times daily 21 tablet 0     fluticasone-salmeterol (AIRDUO RESPICLICK) 113-14 MCG/ACT inhaler Inhale 1 puff into the lungs 2 times daily 1 Inhaler 3     MULTI-VITAMIN OR TABS one tablet daily 30 0     Allergies   Allergen Reactions     Cats      Dogs      No Known Drug Allergies      Recent Labs   Lab Test 09/19/18  1524   *   HDL 56   TRIG 245*   ALT 36   CR 1.12   GFRESTIMATED 71   GFRESTBLACK 86   POTASSIUM 3.9      BP Readings from Last 3 Encounters:   06/12/19 134/68   10/09/18 140/88   09/19/18 132/84    Wt Readings from Last 3 Encounters:   06/12/19 88.9 kg (196 lb)   10/09/18 89.8 kg (198 lb)   09/19/18 89.8 kg (198 lb)           Reviewed and updated as needed this visit by Provider         Review of Systems   ROS COMP: Constitutional, HEENT, cardiovascular, pulmonary, gi and gu systems are negative, except as otherwise noted.      Objective    /68 (Cuff Size: Adult Large)   Pulse 105   Temp 100.1  F (37.8  C) (Tympanic)   Resp 18   Ht 1.829 m (6')   Wt 88.9 kg (196 lb)   SpO2 98%   BMI 26.58 kg/m    Body mass index is 26.58 kg/m .  Physical Exam   GENERAL: healthy, alert and no distress  EYES: Eyes grossly normal to inspection, PERRL and conjunctivae and sclerae normal  HENT: ear canals and TM's normal, nose and mouth without ulcers or lesions  NECK: no adenopathy, no asymmetry, masses, or scars and thyroid normal to palpation  RESP: lungs clear to auscultation - no rales, rhonchi or wheezes  CV: regular rate and rhythm, normal S1 S2, no S3 or  S4, no murmur, click or rub, no peripheral edema and peripheral pulses strong  ABDOMEN: soft, nontender, no hepatosplenomegaly, no masses and bowel sounds normal  MS: no gross musculoskeletal defects noted, no edema            Assessment & Plan     1. Acute sinusitis with symptoms > 10 days  For last 1-2 weeks with fever and chill, has sinus tenderness,   Will have him to start abx,   - amoxicillin-clavulanate (AUGMENTIN) 875-125 MG tablet; Take 1 tablet by mouth 2 times daily  Dispense: 20 tablet; Refill: 0  - baclofen (LIORESAL) 10 MG tablet; Take 1 tablet (10 mg) by mouth 3 times daily  Dispense: 21 tablet; Refill: 0    2. Hiccup  Has been hiccupping for last 2 days nonstop, been trying several home remedies and not working  Will have him to try baclofen tid for next 4-6 days   - baclofen (LIORESAL) 10 MG tablet; Take 1 tablet (10 mg) by mouth 3 times daily  Dispense: 21 tablet; Refill: 0     BMI:   Estimated body mass index is 26.58 kg/m  as calculated from the following:    Height as of this encounter: 1.829 m (6').    Weight as of this encounter: 88.9 kg (196 lb).           FUTURE APPOINTMENTS:       - Follow-up visit in 3 months for CPE    No follow-ups on file.    Zelalem Kwong MD  [facility] [place]

## 2019-06-12 NOTE — TELEPHONE ENCOUNTER
Routing refill request to provider for review/approval because:  [facility]

## 2019-06-12 NOTE — TELEPHONE ENCOUNTER
He has no insurance and wasn't willing for me to connect him with scheduling for an appointment. He has an history of asthma and I explained he should be seen within 24 hours. He declined. He had used some of his friend's inhaler which had helped. I explained the office visit or urgent care visit is $75 up front and the rest can be billed.  Vera Caballero RN-[facility]      Reason for Disposition    Hiccups interfere with work, school, or sleep    Additional Information    Negative: Severe difficulty breathing (e.g., struggling for each breath, speaks in single words)    Negative: Bluish (or gray) lips or face now    Negative: [1] Rapid onset of cough AND [2] has hives    Negative: Coughing started suddenly after medicine, an allergic food or bee sting    Negative: [1] Difficulty breathing AND [2] exposure to flames, smoke, or fumes    Negative: [1] Stridor AND [2] difficulty breathing    Negative: Sounds like a life-threatening emergency to the triager    Negative: Choked on object of food that could be caught in the throat    Negative: Chest pain is main symptom    Negative: [1] Previous asthma attacks AND [2] this feels like asthma attack    Negative: Cough lasts > 3 weeks    Negative: Wet (productive) cough (i.e., white-yellow, yellow, green, or anthony colored sputum)    Negative: Chest pain  (Exception: MILD central chest pain, present only when coughing)    Negative: Difficulty breathing    Negative: Patient sounds very sick or weak to the triager    Negative: [1] Coughed up blood AND [2] > 1 tablespoon (15 ml) (Exception: blood-tinged sputum)    Negative: Fever > 103 F (39.4 C)    Negative: [1] Fever > 101 F (38.3 C) AND [2] age > 60    Negative: [1] Fever > 100.0 F (37.8 C) AND [2] bedridden (e.g., nursing home patient, CVA, chronic illness, recovering from surgery)    Negative: [1] Fever > 100.0 F (37.8 C) AND [2] diabetes mellitus or weak immune system (e.g., HIV positive, cancer chemo,  splenectomy, chronic steroids)    Negative: Wheezing is present    Negative: [1] Ankle swelling AND [2] swelling is increasing    Negative: SEVERE coughing spells (e.g., whooping sound after coughing, vomiting after coughing)    Negative: [1] Continuous (nonstop) coughing interferes with work or school AND [2] no improvement using cough treatment per protocol    Negative: Fever present > 3 days (72 hours)    Negative: [1] Fever returns after gone for over 24 hours AND [2] symptoms worse or not improved    Negative: [1] Using nasal washes and pain medicine > 24 hours AND [2] sinus pain (around cheekbone or eye) persists    Negative: Earache is present    Negative: Cough has been present for > 3 weeks    Negative: [1] Nasal discharge AND [2] present > 10 days    Negative: [1] Coughed up blood-tinged sputum AND [2] more than once    Negative: [1] Patient also has allergy symptoms (e.g., itchy eyes, clear nasal discharge, postnasal drip) AND [2] they are acting up    Negative: Taking an ACE Inhibitor medication  (e.g., benazepril/LOTENSIN, captopril/CAPOTEN, enalapril/VASOTEC, lisinopril/ZESTRIL)    Negative: Exposure to TB (Tuberculosis)    Negative: Cough    Cough with cold symptoms (e.g., runny nose, postnasal drip, throat clearing)    Negative: Chest pain    Negative: Difficulty breathing    Negative: [1] Abdomen pain is main symptom AND [2] adult male    Negative: [1] Abdomen pain is main symptom AND [2] adult female    Negative: Vomiting    Negative: Patient sounds very sick or weak to the triager    Negative: [1] Hiccups present > 3 hours AND [2] severe AND [3] no improvement using hiccup treatment per protocol    Negative: [1] Hiccups present > 24 hours AND [2] nearly continuous    Protocols used: SPMJVUD-T-CL, COUGH - ACUTE NON-PRODUCTIVE-A-AH

## 2019-06-13 RX ORDER — FLUTICASONE PROPIONATE AND SALMETEROL 113; 14 UG/1; UG/1
1 POWDER, METERED RESPIRATORY (INHALATION) 2 TIMES DAILY
Qty: 1 INHALER | Refills: 0 | Status: SHIPPED | OUTPATIENT
Start: 2019-06-13 | End: 2019-06-24

## 2019-06-13 NOTE — TELEPHONE ENCOUNTER
Brandie Ashford contacted Henrik on 06/13/19 and left a message. If patient calls back please schedule appointment in 1 month for a med check.        .Brandie PARTIDA    [facility] [place]

## 2019-06-13 NOTE — TELEPHONE ENCOUNTER
Patient is not active on MyChart. Routing to team to inform and assist in scheduling.   Tiffanie Drake RN   [facility] - Triage

## 2019-06-14 NOTE — TELEPHONE ENCOUNTER
Brandie Ashford contacted Henrik on 06/14/19 and left a message. If patient calls back please schedule appointment in 1 month for a med check.        .Brandie PARTIDA    [facility] [place]

## 2019-06-17 NOTE — TELEPHONE ENCOUNTER
lvm for pt to call back. Please schedule OV. I am sending him a letter today as well    Gypsy Brooks

## 2019-06-21 ENCOUNTER — TELEPHONE (OUTPATIENT)
Dept: FAMILY MEDICINE | Facility: CLINIC | Age: 44
End: 2019-06-21

## 2019-06-21 DIAGNOSIS — J45.30 MILD PERSISTENT ASTHMA WITHOUT COMPLICATION: ICD-10-CM

## 2019-06-21 NOTE — TELEPHONE ENCOUNTER
Reason for Call:  Call Back     Detailed comments:    Pt does not have ins at this point. He is [place] sent the [place]. It would not cost as much to do it this way. He is not sure if what it prints out from [facility] will be good enough to do this. Please call him back to advise on how to do this.   fluticasone-salmeterol (AIRDUO RESPICLICK) 113-14 MCG/ACT inhaler    Phone Number Patient can be reached at: Cell number on file:    Telephone Information:   Mobile [phone]       Best Time: any     Can we leave a detailed message on this number? Yes    Call taken on 6/21/2019 at 12:57 PM by Chelsey Rodriguez

## 2019-06-21 NOTE — TELEPHONE ENCOUNTER
We can print a script and he can send it where he would like. Can we also have him check with his insurance There may be a cheaper option with his insurance

## 2019-06-21 NOTE — TELEPHONE ENCOUNTER
Yoandy does not have insurance right now, so would like hard copy rx and will pick it up .  Allison Mandujano,

## 2019-06-24 RX ORDER — FLUTICASONE PROPIONATE AND SALMETEROL 113; 14 UG/1; UG/1
1 POWDER, METERED RESPIRATORY (INHALATION) 2 TIMES DAILY
Qty: 1 INHALER | Refills: 3 | Status: SHIPPED | OUTPATIENT
Start: 2019-06-24 | End: 2020-06-15

## 2019-07-02 ENCOUNTER — MYC REFILL (OUTPATIENT)
Dept: FAMILY MEDICINE | Facility: CLINIC | Age: 44
End: 2019-07-02

## 2019-07-02 DIAGNOSIS — J45.30 MILD PERSISTENT ASTHMA WITHOUT COMPLICATION: ICD-10-CM

## 2019-07-03 ENCOUNTER — OFFICE VISIT (OUTPATIENT)
Dept: FAMILY MEDICINE | Facility: CLINIC | Age: 44
End: 2019-07-03

## 2019-07-03 ENCOUNTER — ANCILLARY PROCEDURE (OUTPATIENT)
Dept: GENERAL RADIOLOGY | Facility: CLINIC | Age: 44
End: 2019-07-03
Attending: FAMILY MEDICINE

## 2019-07-03 VITALS
DIASTOLIC BLOOD PRESSURE: 77 MMHG | BODY MASS INDEX: 26.01 KG/M2 | WEIGHT: 192 LBS | HEART RATE: 80 BPM | OXYGEN SATURATION: 96 % | HEIGHT: 72 IN | SYSTOLIC BLOOD PRESSURE: 127 MMHG | TEMPERATURE: 98.3 F

## 2019-07-03 DIAGNOSIS — R05.9 COUGH: ICD-10-CM

## 2019-07-03 DIAGNOSIS — R05.9 COUGH: Primary | ICD-10-CM

## 2019-07-03 DIAGNOSIS — J20.9 ACUTE BRONCHITIS WITH SYMPTOMS > 10 DAYS: ICD-10-CM

## 2019-07-03 PROCEDURE — 99214 OFFICE O/P EST MOD 30 MIN: CPT | Performed by: FAMILY MEDICINE

## 2019-07-03 PROCEDURE — 71046 X-RAY EXAM CHEST 2 VIEWS: CPT | Mod: FY

## 2019-07-03 RX ORDER — ALBUTEROL SULFATE 90 UG/1
AEROSOL, METERED RESPIRATORY (INHALATION)
Qty: 18 G | Refills: 11 | Status: SHIPPED | OUTPATIENT
Start: 2019-07-03 | End: 2020-06-15

## 2019-07-03 RX ORDER — DOXYCYCLINE 100 MG/1
100 CAPSULE ORAL 2 TIMES DAILY
Qty: 14 CAPSULE | Refills: 0 | Status: SHIPPED | OUTPATIENT
Start: 2019-07-03 | End: 2019-07-10

## 2019-07-03 RX ORDER — PREDNISONE 20 MG/1
20 TABLET ORAL DAILY
Qty: 7 TABLET | Refills: 0 | Status: SHIPPED | OUTPATIENT
Start: 2019-07-03 | End: 2019-07-10

## 2019-07-03 ASSESSMENT — MIFFLIN-ST. JEOR: SCORE: 1793.91

## 2019-07-03 NOTE — PROGRESS NOTES
Subjective     Henrik Almanzar is a 44 year old male who presents to clinic today for the following health issues:    HPI   RESPIRATORY SYMPTOMS  Some underlying history of bronchitis/asthma.  He is using his maintenance inhaler but only using nebulizer as needed.  Denies any chest pain no shortness of breath however cough is dry and is nagging over the last 2 to 3 weeks.    Duration: recheck 6/12 ongoing finished a course of abx, it helped a lot but not completely     Description  Dry cough and fatigue/malaise    Severity: moderate    Accompanying signs and symptoms: None    History (predisposing factors):  none    Precipitating or alleviating factors: None    Therapies tried and outcome:  none            Reviewed and updated as needed this visit by Provider         Review of Systems   ROS COMP: Constitutional, HEENT, cardiovascular, pulmonary, gi and gu systems are negative, except as otherwise noted.      Objective    /77   Pulse 80   Temp 98.3  F (36.8  C) (Tympanic)   Ht 1.829 m (6')   Wt 87.1 kg (192 lb)   SpO2 96%   BMI 26.04 kg/m    Body mass index is 26.04 kg/m .  Physical Exam   GENERAL: healthy, alert and no distress  NECK: no adenopathy, no asymmetry, masses, or scars and thyroid normal to palpation  RESP: lungs clear to auscultation - no rales, rhonchi or wheezes  CV: regular rate and rhythm, normal S1 S2, no S3 or S4, no murmur, click or rub, no peripheral edema and peripheral pulses strong  ABDOMEN: soft, nontender, no hepatosplenomegaly, no masses and bowel sounds normal            Assessment & Plan       ICD-10-CM    1. Cough R05 XR Chest 2 Views     predniSONE (DELTASONE) 20 MG tablet     doxycycline hyclate (VIBRAMYCIN) 100 MG capsule   2. Acute bronchitis with symptoms > 10 days J20.9 predniSONE (DELTASONE) 20 MG tablet     doxycycline hyclate (VIBRAMYCIN) 100 MG capsule     Patient has upper respiratory symptoms.  His chest x-ray is negative.  Official report pending we will  follow-up on that.  I will suggest patient to treat with prednisone to decrease inflammation and will change antibiotic to doxycycline to see if it has better coverage.  If symptoms does not improve advised to follow-up.  Warning signs were discussed with the patient.  BMI:   Estimated body mass index is 26.04 kg/m  as calculated from the following:    Height as of this encounter: 1.829 m (6').    Weight as of this encounter: 87.1 kg (192 lb).               No follow-ups on file.    Elie Sanchez MD  [facility] [place]

## 2019-07-03 NOTE — TELEPHONE ENCOUNTER
"Requested Prescriptions   Pending Prescriptions Disp Refills     albuterol (VENTOLIN HFA) 108 (90 Base) MCG/ACT inhaler 18 g 11     Sig: INHALE 2 PUFFS INTO THE LUNGS EVERY 6 HOURS AS NEEDED FOR WHEEZING AND SHORTNESS OF BREATH  Last Written Prescription Date:  9/19/18  Last Fill Quantity: 18,  # refills: 11   Last office visit: 6/12/2019 with prescribing provider:  Varghese   Future Office Visit:           Asthma Maintenance Inhalers - Anticholinergics Failed - 7/2/2019 10:17 AM        Failed - Asthma control assessment score within normal limits in last 6 months     Please review ACT score.           Passed - Patient is age 12 years or older        Passed - Medication is active on med list        Passed - Recent (6 mo) or future (30 days) visit within the authorizing provider's specialty     Patient had office visit in the last 6 months or has a visit in the next 30 days with authorizing provider or within the authorizing provider's specialty.  See \"Patient Info\" tab in inbasket, or \"Choose Columns\" in Meds & Orders section of the refill encounter.            ACT Total Scores 9/19/2018 10/9/2018 7/3/2019   ACT TOTAL SCORE (Goal Greater than or Equal to 20) 21 22 17   In the past 12 months, how many times did you visit the emergency room for your asthma without being admitted to the hospital? 0 0 0   In the past 12 months, how many times were you hospitalized overnight because of your asthma? 0 0 0     Routing refill request to provider for review/approval because:  ACT score below 20.    Gloria DIXON RN  EP Triage          "

## 2019-07-04 ASSESSMENT — ASTHMA QUESTIONNAIRES: ACT_TOTALSCORE: 17

## 2019-10-03 ENCOUNTER — HEALTH MAINTENANCE LETTER (OUTPATIENT)
Age: 44
End: 2019-10-03

## 2020-02-08 ENCOUNTER — HEALTH MAINTENANCE LETTER (OUTPATIENT)
Age: 45
End: 2020-02-08

## 2020-06-12 DIAGNOSIS — J45.30 MILD PERSISTENT ASTHMA WITHOUT COMPLICATION: ICD-10-CM

## 2020-06-15 ENCOUNTER — VIRTUAL VISIT (OUTPATIENT)
Dept: FAMILY MEDICINE | Facility: CLINIC | Age: 45
End: 2020-06-15

## 2020-06-15 ENCOUNTER — TELEPHONE (OUTPATIENT)
Dept: FAMILY MEDICINE | Facility: CLINIC | Age: 45
End: 2020-06-15

## 2020-06-15 DIAGNOSIS — J45.30 MILD PERSISTENT ASTHMA WITHOUT COMPLICATION: ICD-10-CM

## 2020-06-15 PROCEDURE — 99214 OFFICE O/P EST MOD 30 MIN: CPT | Mod: 95 | Performed by: FAMILY MEDICINE

## 2020-06-15 RX ORDER — FLUTICASONE PROPIONATE AND SALMETEROL 113; 14 UG/1; UG/1
1 POWDER, METERED RESPIRATORY (INHALATION) 2 TIMES DAILY
Qty: 1 INHALER | Refills: 11 | Status: SHIPPED | OUTPATIENT
Start: 2020-06-15 | End: 2021-06-17

## 2020-06-15 RX ORDER — ALBUTEROL SULFATE 90 UG/1
AEROSOL, METERED RESPIRATORY (INHALATION)
Qty: 18 G | Refills: 11 | Status: SHIPPED | OUTPATIENT
Start: 2020-06-15 | End: 2022-02-17

## 2020-06-15 NOTE — TELEPHONE ENCOUNTER
Reason for call:  Patient reporting a symptom    Symptom or request: Asthma, cough, SOB due to Asthma    Duration (how long have symptoms been present): 2 months    Have you been treated for this before? Yes    Additional comments: Please triage patient to see how soon we need to see patient in clinic    Phone Number patient can be reached at:  Cell number on file:    Telephone Information:   Mobile [phone]       Best Time:  any    Can we leave a detailed message on this number:  YES    Call taken on 6/15/2020 at 12:13 PM by Tova Browning

## 2020-06-15 NOTE — TELEPHONE ENCOUNTER
Patient denies having any new or different symptoms. States that he has had asthma for 25 years.   Patient may be returning to work and his son returning to school. Patient has concerns related to returning to work with his history of asthma and lencho COVID 19.    Patient also states that he was part of the protesting. He is asking if he should get tested. Brissa Millard RN

## 2020-06-15 NOTE — PROGRESS NOTES
"Henrik Almanzar is a 45 year old male who is being evaluated via a billable video visit.      The patient has been notified of following:     \"This video visit will be conducted via a call between you and your physician/provider. We have found that certain health care needs can be provided without the need for an in-person physical exam.  This service lets us provide the care you need with a video conversation.  If a prescription is necessary we can send it directly to your pharmacy.  If lab work is needed we can place an order for that and you can then stop by our lab to have the test done at a later time.    Video visits are billed at different rates depending on your insurance coverage.  Please reach out to your insurance provider with any questions.    If during the course of the call the physician/provider feels a video visit is not appropriate, you will not be charged for this service.\"    Patient has given verbal consent for Video visit? Yes    Will anyone else be joining your video visit? No    Subjective     Henrik Almanzar is a 45 year old male who presents today via video visit for the following health issues:    HPI  Asthma Follow-Up  Patient have history of mild persistent asthma.  He was out of his maintenance inhaler and he noticed he has been using slight more often his asthma medication.  Otherwise denies any chest pains no shortness of breath.  No hospitalization.  Was ACT completed today?    Yes    ACT Total Scores 7/3/2019   ACT TOTAL SCORE (Goal Greater than or Equal to 20) 17   In the past 12 months, how many times did you visit the emergency room for your asthma without being admitted to the hospital? 0   In the past 12 months, how many times were you hospitalized overnight because of your asthma? 0         How many days per week do you miss taking your asthma controller medication?  I do not have an asthma controller medication    Please describe any recent triggers for your asthma: " pollens and animal dander    Have you had any Emergency Room Visits, Urgent Care Visits, or Hospital Admissions since your last office visit?  No      How many servings of fruits and vegetables do you eat daily?  2-3    On average, how many sweetened beverages do you drink each day (Examples: soda, juice, sweet tea, etc.  Do NOT count diet or artificially sweetened beverages)?   0    How many days per week do you exercise enough to make your heart beat faster? 5    How many minutes a day do you exercise enough to make your heart beat faster? 30 - 60    How many days per week do you miss taking your medication? 0        Video Start Time: 2:00            Reviewed and updated as needed this visit by Provider         Review of Systems   Constitutional, HEENT, cardiovascular, pulmonary, gi and gu systems are negative, except as otherwise noted.      Objective    There were no vitals taken for this visit.  Estimated body mass index is 26.04 kg/m  as calculated from the following:    Height as of 7/3/19: 1.829 m (6').    Weight as of 7/3/19: 87.1 kg (192 lb).  Physical Exam     GENERAL: Healthy, alert and no distress  EYES: Eyes grossly normal to inspection.  No discharge or erythema, or obvious scleral/conjunctival abnormalities.  RESP: No audible wheeze, cough, or visible cyanosis.  No visible retractions or increased work of breathing.    SKIN: Visible skin clear. No significant rash, abnormal pigmentation or lesions.  NEURO: Cranial nerves grossly intact.  Mentation and speech appropriate for age.  PSYCH: Mentation appears normal, affect normal/bright, judgement and insight intact, normal speech and appearance well-groomed.              Assessment & Plan     1. Mild persistent asthma without complication  Patient has a mild persistent asthma which is relatively well controlled.  I have started him back on his medication.  Denies any shortness of breath no recent illness.  We did discuss about signs and symptoms of  COVID.  He will let us know if there is any change in symptoms.  I will see him back in the clinic in 3 to 6 months.  - albuterol (VENTOLIN HFA) 108 (90 Base) MCG/ACT inhaler; INHALE 2 PUFFS INTO THE LUNGS EVERY 6 HOURS AS NEEDED FOR WHEEZING AND SHORTNESS OF BREATH  Dispense: 18 g; Refill: 11  - fluticasone-salmeterol (AIRDUO RESPICLICK) 113-14 MCG/ACT inhaler; Inhale 1 puff into the lungs 2 times daily  Dispense: 1 Inhaler; Refill: 11     BMI:   Estimated body mass index is 26.04 kg/m  as calculated from the following:    Height as of 7/3/19: 1.829 m (6').    Weight as of 7/3/19: 87.1 kg (192 lb).         Elie Sanchez MD  [facility]      Video-Visit Details    Type of service:  Video Visit    Video End Time:2:10 PM    Originating Location (pt. Location): Home    [place]  [facility] [place] used for Video Visit: Doximity    No follow-ups on file.       Elie Sanchez MD

## 2020-06-15 NOTE — TELEPHONE ENCOUNTER
Please call patient to schedule appointment for asthma, has not been seen within the last 6 months.  Needs updated ACT, please send in mail.    JACKI Correia, RN  [employer] [facility]

## 2020-06-17 RX ORDER — FLUTICASONE PROPIONATE AND SALMETEROL 113; 14 UG/1; UG/1
POWDER, METERED RESPIRATORY (INHALATION)
OUTPATIENT
Start: 2020-06-17

## 2020-10-07 ENCOUNTER — IMMUNIZATION (OUTPATIENT)
Dept: NURSING | Facility: CLINIC | Age: 45
End: 2020-10-07

## 2020-10-07 PROCEDURE — 90686 IIV4 VACC NO PRSV 0.5 ML IM: CPT

## 2020-10-07 PROCEDURE — 90471 IMMUNIZATION ADMIN: CPT

## 2020-12-09 ENCOUNTER — TELEPHONE (OUTPATIENT)
Dept: FAMILY MEDICINE | Facility: CLINIC | Age: 45
End: 2020-12-09

## 2020-12-09 NOTE — TELEPHONE ENCOUNTER
Needs of attention regarding:  -Asthma    Health Maintenance Topics with due status: Overdue       Topic Date Due    Pneumococcal Vaccine: Pediatrics (0 to 5 Years) and At-Risk Patients (6 to 64 Years) 04/05/1980    HEPATITIS C SCREENING 04/05/1992    ASTHMA ACTION PLAN 12/15/2006    PREVENTIVE CARE VISIT 09/19/2019    ASTHMA CONTROL TEST 01/03/2020       Communication:  See MyChart message

## 2021-01-27 ENCOUNTER — OFFICE VISIT (OUTPATIENT)
Dept: FAMILY MEDICINE | Facility: CLINIC | Age: 46
End: 2021-01-27
Payer: COMMERCIAL

## 2021-01-27 ENCOUNTER — TELEPHONE (OUTPATIENT)
Dept: FAMILY MEDICINE | Facility: CLINIC | Age: 46
End: 2021-01-27

## 2021-01-27 VITALS
HEART RATE: 78 BPM | BODY MASS INDEX: 27.67 KG/M2 | OXYGEN SATURATION: 97 % | DIASTOLIC BLOOD PRESSURE: 86 MMHG | SYSTOLIC BLOOD PRESSURE: 128 MMHG | WEIGHT: 204 LBS | TEMPERATURE: 98.5 F

## 2021-01-27 DIAGNOSIS — J45.30 MILD PERSISTENT ASTHMA WITHOUT COMPLICATION: Primary | ICD-10-CM

## 2021-01-27 DIAGNOSIS — F10.10 ALCOHOL ABUSE, EPISODIC DRINKING BEHAVIOR: ICD-10-CM

## 2021-01-27 DIAGNOSIS — E78.5 HYPERLIPIDEMIA LDL GOAL <160: ICD-10-CM

## 2021-01-27 LAB
ALBUMIN SERPL-MCNC: 4 G/DL (ref 3.4–5)
ALP SERPL-CCNC: 66 U/L (ref 40–150)
ALT SERPL W P-5'-P-CCNC: 27 U/L (ref 0–70)
ANION GAP SERPL CALCULATED.3IONS-SCNC: 3 MMOL/L (ref 3–14)
AST SERPL W P-5'-P-CCNC: 18 U/L (ref 0–45)
BILIRUB SERPL-MCNC: 0.8 MG/DL (ref 0.2–1.3)
BUN SERPL-MCNC: 15 MG/DL (ref 7–30)
CALCIUM SERPL-MCNC: 9.6 MG/DL (ref 8.5–10.1)
CHLORIDE SERPL-SCNC: 107 MMOL/L (ref 94–109)
CHOLEST SERPL-MCNC: 274 MG/DL
CO2 SERPL-SCNC: 31 MMOL/L (ref 20–32)
CREAT SERPL-MCNC: 1.05 MG/DL (ref 0.66–1.25)
ERYTHROCYTE [DISTWIDTH] IN BLOOD BY AUTOMATED COUNT: 13 % (ref 10–15)
GFR SERPL CREATININE-BSD FRML MDRD: 84 ML/MIN/{1.73_M2}
GLUCOSE SERPL-MCNC: 106 MG/DL (ref 70–99)
HCT VFR BLD AUTO: 44.6 % (ref 40–53)
HDLC SERPL-MCNC: 56 MG/DL
HGB BLD-MCNC: 15.4 G/DL (ref 13.3–17.7)
LDLC SERPL CALC-MCNC: 181 MG/DL
MCH RBC QN AUTO: 30.9 PG (ref 26.5–33)
MCHC RBC AUTO-ENTMCNC: 34.5 G/DL (ref 31.5–36.5)
MCV RBC AUTO: 89 FL (ref 78–100)
NONHDLC SERPL-MCNC: 218 MG/DL
PLATELET # BLD AUTO: 241 10E9/L (ref 150–450)
POTASSIUM SERPL-SCNC: 4 MMOL/L (ref 3.4–5.3)
PROT SERPL-MCNC: 7.6 G/DL (ref 6.8–8.8)
RBC # BLD AUTO: 4.99 10E12/L (ref 4.4–5.9)
SODIUM SERPL-SCNC: 141 MMOL/L (ref 133–144)
TRIGL SERPL-MCNC: 187 MG/DL
WBC # BLD AUTO: 5.4 10E9/L (ref 4–11)

## 2021-01-27 PROCEDURE — 80061 LIPID PANEL: CPT | Performed by: FAMILY MEDICINE

## 2021-01-27 PROCEDURE — 80053 COMPREHEN METABOLIC PANEL: CPT | Performed by: FAMILY MEDICINE

## 2021-01-27 PROCEDURE — 99214 OFFICE O/P EST MOD 30 MIN: CPT | Performed by: FAMILY MEDICINE

## 2021-01-27 PROCEDURE — 36415 COLL VENOUS BLD VENIPUNCTURE: CPT | Performed by: FAMILY MEDICINE

## 2021-01-27 PROCEDURE — 85027 COMPLETE CBC AUTOMATED: CPT | Performed by: FAMILY MEDICINE

## 2021-01-27 NOTE — TELEPHONE ENCOUNTER
----- Message from Elie Sanchez MD sent at 1/27/2021  4:34 PM CST -----      I have reviewed your recent labs. Here are the results:    -Normal red blood cell (hgb) levels, normal white blood cell count and normal platelet levels.  -Liver and kidney functions are normal.  -Blood glucose is slightly on the upper side but not in diabetic range we will continue to monitor no medications.  -cholesterol levels indicate very elevated LDL, which is bad cholesterol. This can increase the risk of heart disease. I would suggest a low-dose of cholesterol lowering medication called Lipitor. You need to avoid drinking ,do regular exercise ,eat healthy and lose some weight if necessary. Follow-up in 6 months for recheck.    -If you are willing to take that cholesterol medication please let us know so that we can prescribe that to your pharmacy. This is called Lipitor we will start at 20 mg.      Elie Sanchez MD  1/27/2021

## 2021-01-27 NOTE — PROGRESS NOTES
Assessment & Plan     Mild persistent asthma without complication  Patient asthma is well controlled.  He has medication at his pharmacy formulary refill he will let us know.    Alcohol abuse, episodic drinking behavior  Patient has had episodic drinking behavior he would like to get blood work done.  Advised limiting alcohol doing regular exercise eating healthy.  - Comprehensive metabolic panel  - CBC with platelets  - Lipid panel reflex to direct LDL Fasting    Hyperlipidemia LDL goal <160  Labs ordered once done we will follow-up on those.        Elie Sanchez MD  [facility] PATEL Pitt is a 45 year old who presents to clinic today for the following health issues     HPI       Asthma Follow-Up    Was ACT completed today?    Yes    ACT Total Scores 1/27/2021   ACT TOTAL SCORE (Goal Greater than or Equal to 20) 23   In the past 12 months, how many times did you visit the emergency room for your asthma without being admitted to the hospital? 0   In the past 12 months, how many times were you hospitalized overnight because of your asthma? 0          How many days per week do you miss taking your asthma controller medication?  0    Please describe any recent triggers for your asthma: upper respiratory infections, dust mites, pollens, animal dander and humidity    Have you had any Emergency Room Visits, Urgent Care Visits, or Hospital Admissions since your last office visit?  No      How many servings of fruits and vegetables do you eat daily?  2-3    On average, how many sweetened beverages do you drink each day (Examples: soda, juice, sweet tea, etc.  Do NOT count diet or artificially sweetened beverages)?   0    How many days per week do you exercise enough to make your heart beat faster? 5    How many minutes a day do you exercise enough to make your heart beat faster? 30 - 60    How many days per week do you miss taking your medication? 0    Concern - check lab work for liver  and kidney function   Onset: 1 week ago   Description: check labs to see how kidney and liver function is doing since he stopped drinking a week ago   Intensity: mild  Progression of Symptoms:  improving  Accompanying Signs & Symptoms: none  Previous history of similar problem:   Precipitating factors:        Worsened by:   Alleviating factors:        Improved by:   Therapies tried and outcome: None      Review of Systems   Constitutional, HEENT, cardiovascular, pulmonary, gi and gu systems are negative, except as otherwise noted.      Objective    There were no vitals taken for this visit.  There is no height or weight on file to calculate BMI.  Physical Exam   GENERAL: healthy, alert and no distress  NECK: no adenopathy, no asymmetry, masses, or scars and thyroid normal to palpation  RESP: lungs clear to auscultation - no rales, rhonchi or wheezes  CV: regular rate and rhythm, normal S1 S2, no S3 or S4, no murmur, click or rub, no peripheral edema and peripheral pulses strong  ABDOMEN: soft, nontender, no hepatosplenomegaly, no masses and bowel sounds normal  MS: no gross musculoskeletal defects noted, no edema

## 2021-01-28 RX ORDER — ATORVASTATIN CALCIUM 20 MG/1
20 TABLET, FILM COATED ORAL DAILY
Qty: 90 TABLET | Refills: 3 | Status: SHIPPED | OUTPATIENT
Start: 2021-01-28 | End: 2023-04-13

## 2021-01-28 ASSESSMENT — ASTHMA QUESTIONNAIRES: ACT_TOTALSCORE: 23

## 2021-03-21 ENCOUNTER — HEALTH MAINTENANCE LETTER (OUTPATIENT)
Age: 46
End: 2021-03-21

## 2021-06-17 DIAGNOSIS — J45.30 MILD PERSISTENT ASTHMA WITHOUT COMPLICATION: ICD-10-CM

## 2021-06-17 RX ORDER — FLUTICASONE PROPIONATE AND SALMETEROL 113; 14 UG/1; UG/1
POWDER, METERED RESPIRATORY (INHALATION)
Qty: 1 EACH | Refills: 3 | Status: SHIPPED | OUTPATIENT
Start: 2021-06-17 | End: 2022-06-06

## 2021-06-17 NOTE — TELEPHONE ENCOUNTER
Routing refill request to provider for review/approval because:    Order for Serevent, Striverdi, or Foradil and pt has steroid inhaler  Brissa Millard RN

## 2021-09-05 ENCOUNTER — HEALTH MAINTENANCE LETTER (OUTPATIENT)
Age: 46
End: 2021-09-05

## 2021-10-19 ENCOUNTER — OFFICE VISIT (OUTPATIENT)
Dept: FAMILY MEDICINE | Facility: CLINIC | Age: 46
End: 2021-10-19
Payer: COMMERCIAL

## 2021-10-19 VITALS
TEMPERATURE: 97.7 F | DIASTOLIC BLOOD PRESSURE: 88 MMHG | SYSTOLIC BLOOD PRESSURE: 130 MMHG | BODY MASS INDEX: 27.22 KG/M2 | HEIGHT: 72 IN | OXYGEN SATURATION: 98 % | HEART RATE: 67 BPM | WEIGHT: 201 LBS

## 2021-10-19 DIAGNOSIS — F10.10 ALCOHOL ABUSE, EPISODIC DRINKING BEHAVIOR: ICD-10-CM

## 2021-10-19 DIAGNOSIS — R10.11 RUQ ABDOMINAL PAIN: Primary | ICD-10-CM

## 2021-10-19 PROCEDURE — 36415 COLL VENOUS BLD VENIPUNCTURE: CPT | Performed by: FAMILY MEDICINE

## 2021-10-19 PROCEDURE — 80053 COMPREHEN METABOLIC PANEL: CPT | Performed by: FAMILY MEDICINE

## 2021-10-19 PROCEDURE — 99214 OFFICE O/P EST MOD 30 MIN: CPT | Performed by: FAMILY MEDICINE

## 2021-10-19 ASSESSMENT — MIFFLIN-ST. JEOR: SCORE: 1824.73

## 2021-10-19 NOTE — PROGRESS NOTES
Assessment & Plan     RUQ abdominal pain  Advised to get ultrasound abdomen to look for liver to see if any abnormality hopefully that will be normal we will repeat his blood work.  Reassured and congratulated him on his abstinence from alcohol.  Advised lose some weight eat  Healthy, do reular exercise.  - US Abdomen Limited; Future  - Comprehensive metabolic panel (BMP + Alb, Alk Phos, ALT, AST, Total. Bili, TP); Future  - Comprehensive metabolic panel (BMP + Alb, Alk Phos, ALT, AST, Total. Bili, TP)    Alcohol abuse, episodic drinking behavior  Patient is congratulated on his alcohol abstinence which is very helpful in healthy behavior.  - US Abdomen Limited; Future             BMI:   Estimated body mass index is 27.26 kg/m  as calculated from the following:    Height as of this encounter: 1.829 m (6').    Weight as of this encounter: 91.2 kg (201 lb).           No follow-ups on file.    Elie Sanchez MD  [facility] PATEL Pitt is a 46 year old who presents for the following health issues     HPI     Abdominal Pain      Duration: x ongoing for year -check liver and kidney due to heavy drinking     Description (location/character/radiation)RUQ pain       Associated flank pain: NONE     Intensity:  mild, moderate    Accompanying signs and symptoms:        Fever/Chills: no        Gas/Bloating: no        Nausea/vomitting: no        Diarrhea: no        Dysuria or Hematuria: no     History (previous similar pain/trauma/previous testing):     Precipitating or alleviating factors:       Pain worse with eating/BM/urination:        Pain relieved by BM: no     Therapies tried and outcome: None    LMP:  not applicable  He is leading a better life with no alcohol.  Overall feels healthy sometimes complain of right upper quadrant discomfort.        Review of Systems   Constitutional, HEENT, cardiovascular, pulmonary, gi and gu systems are negative, except as otherwise noted.      Objective     /88   Pulse 67   Temp 97.7  F (36.5  C) (Tympanic)   Ht 1.829 m (6')   Wt 91.2 kg (201 lb)   SpO2 98%   BMI 27.26 kg/m    Body mass index is 27.26 kg/m .  Physical Exam   GENERAL: healthy, alert and no distress  NECK: no adenopathy, no asymmetry, masses, or scars and thyroid normal to palpation  RESP: lungs clear to auscultation - no rales, rhonchi or wheezes  CV: regular rate and rhythm, normal S1 S2, no S3 or S4, no murmur, click or rub, no peripheral edema and peripheral pulses strong  ABDOMEN: soft, nontender, no hepatosplenomegaly, no masses and bowel sounds normal

## 2021-10-20 ENCOUNTER — HOSPITAL ENCOUNTER (OUTPATIENT)
Dept: ULTRASOUND IMAGING | Facility: CLINIC | Age: 46
Discharge: HOME | End: 2021-10-20
Attending: FAMILY MEDICINE | Admitting: FAMILY MEDICINE
Payer: COMMERCIAL

## 2021-10-20 DIAGNOSIS — R10.11 RUQ ABDOMINAL PAIN: ICD-10-CM

## 2021-10-20 DIAGNOSIS — F10.10 ALCOHOL ABUSE, EPISODIC DRINKING BEHAVIOR: ICD-10-CM

## 2021-10-20 PROCEDURE — 76705 ECHO EXAM OF ABDOMEN: CPT

## 2021-10-21 LAB
ALBUMIN SERPL-MCNC: 4.3 G/DL (ref 3.4–5)
ALP SERPL-CCNC: 67 U/L (ref 40–150)
ALT SERPL W P-5'-P-CCNC: 25 U/L (ref 0–70)
ANION GAP SERPL CALCULATED.3IONS-SCNC: 11 MMOL/L (ref 3–14)
AST SERPL W P-5'-P-CCNC: 23 U/L (ref 0–45)
BILIRUB SERPL-MCNC: 0.7 MG/DL (ref 0.2–1.3)
BUN SERPL-MCNC: 15 MG/DL (ref 7–30)
CALCIUM SERPL-MCNC: 9.1 MG/DL (ref 8.5–10.1)
CHLORIDE BLD-SCNC: 108 MMOL/L (ref 94–109)
CO2 SERPL-SCNC: 23 MMOL/L (ref 20–32)
CREAT SERPL-MCNC: 1.08 MG/DL (ref 0.66–1.25)
GFR SERPL CREATININE-BSD FRML MDRD: 81 ML/MIN/1.73M2
GLUCOSE BLD-MCNC: 83 MG/DL (ref 70–99)
POTASSIUM BLD-SCNC: 4.2 MMOL/L (ref 3.4–5.3)
PROT SERPL-MCNC: 7.7 G/DL (ref 6.8–8.8)
SODIUM SERPL-SCNC: 142 MMOL/L (ref 133–144)

## 2022-01-06 ENCOUNTER — NURSE TRIAGE (OUTPATIENT)
Dept: NURSING | Facility: CLINIC | Age: 47
End: 2022-01-06
Payer: COMMERCIAL

## 2022-01-06 NOTE — TELEPHONE ENCOUNTER
Called pt and relayed Dr. Sanchez's message, see below. He stated his understanding and wanted to notify us that he did test positive with a home today today. Routing to notify.     Sophia MARSHALL RN  [facility]

## 2022-01-06 NOTE — TELEPHONE ENCOUNTER
S-(situation): Call from patient who tested positive for COVID; symptoms since Saturday of cough, congestion, and back ache. Says worse days was Sunday/Monday. Denies fever, shortness of breath or chest pain.    Has a son who has been sick with cough for 4-5 days; says he is better.      B-(background):   Vaccinated plus booster  Says he has mild asthma    A-(assessment): mild symptoms at the moment but does have asthma      R-(recommendations): Contact PCP.  Patient requests message to be routed to PCP. Advised patient to call son's school and both of them will need to isolate per protocol.     Reviewed care advice with caller per RN triage protocol guideline.  Advised to call back with worsening symptoms, concerns or questions.   Caller verbalized understanding.          Aleksey Valente RN/[facility]    Reason for Disposition    HIGH RISK for severe COVID complications (e.g., age > 64 years, obesity with BMI > 25, pregnant, chronic lung disease or other chronic medical condition)  (Exception: Already seen by PCP and no new or worsening symptoms.)    Additional Information    Negative: SEVERE difficulty breathing (e.g., struggling for each breath, speaks in single words)    Negative: Difficult to awaken or acting confused (e.g., disoriented, slurred speech)    Negative: Bluish (or gray) lips or face now    Negative: Shock suspected (e.g., cold/pale/clammy skin, too weak to stand, low BP, rapid pulse)    Negative: Sounds like a life-threatening emergency to the triager    Negative: SEVERE or constant chest pain or pressure (Exception: mild central chest pain, present only when coughing)    Negative: MODERATE difficulty breathing (e.g., speaks in phrases, SOB even at rest, pulse 100-120)    Negative: [1] Headache AND [2] stiff neck (can't touch chin to chest)    Negative: MILD difficulty breathing (e.g., minimal/no SOB at rest, SOB with walking, pulse <100)    Negative: Chest pain or pressure    Negative:  Patient sounds very sick or weak to the triager    Negative: Fever > 103 F (39.4 C)    Negative: [1] Fever > 101 F (38.3 C) AND [2] age > 60 years    Negative: [1] Fever > 100.0 F (37.8 C) AND [2] bedridden (e.g., nursing home patient, CVA, chronic illness, recovering from surgery)    Protocols used: CORONAVIRUS (COVID-19) DIAGNOSED OR [id number] 8.25.2021

## 2022-01-06 NOTE — TELEPHONE ENCOUNTER
At this time continue observation watch for any signs of breathing difficulty quarantine as per protocol

## 2022-01-20 ENCOUNTER — VIRTUAL VISIT (OUTPATIENT)
Dept: FAMILY MEDICINE | Facility: CLINIC | Age: 47
End: 2022-01-20
Payer: COMMERCIAL

## 2022-01-20 DIAGNOSIS — J45.20 MILD INTERMITTENT ASTHMA WITHOUT COMPLICATION: Primary | ICD-10-CM

## 2022-01-20 DIAGNOSIS — M77.8 ELBOW TENDINITIS: ICD-10-CM

## 2022-01-20 PROCEDURE — 99212 OFFICE O/P EST SF 10 MIN: CPT | Mod: 95 | Performed by: FAMILY MEDICINE

## 2022-01-20 NOTE — LETTER
To whom it may concern.      Henrik URBINA Jenelle      1975            Patient is seen in the clinic 1/20/2022.  Patient has issues with elbow tendinitis which is worse with doing specific exersises.  I would suggest him not to use weight lifting  in the facility .  He also have history of mild asthma, with COVID, I would not suggest him to be in public places if is not required.  Please facilitate his request.                    Sincerely,         Elie Sanchez MD    1/20/2022

## 2022-01-20 NOTE — PROGRESS NOTES
Yoandy is a 46 year old who is being evaluated via a billable video/phone visit.      How would you like to obtain your AVS? MyChart  If the video visit is dropped, the invitation should be resent by: Text to cell phone: [phone]  Will anyone else be joining your video visit? No        Assessment & Plan     Mild intermittent asthma without complication      Elbow tendinitis  Patient has elbow tendinitis which was worse using lifting weights in the facility he requested letter to not doing such exersises  I support thatith asthma as well I would suggest him to avoid public places if is not necessary.        BMI:   Estimated body mass index is 27.26 kg/m  as calculated from the following:    Height as of 10/19/21: 1.829 m (6').    Weight as of 10/19/21: 91.2 kg (201 lb).           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  He is requesting a letter so that he can get out of the contract regarding his ongoing symptoms of tendinitis and asthma.    Review of Systems   Constitutional, HEENT, cardiovascular, pulmonary, gi and gu systems are negative, except as otherwise noted.      Objective           Vitals:  No vitals were obtained today due to virtual visit.    Physical Exam   GENERAL: Healthy, alert and no distress  RESP: No audible wheeze, cough, or visible cyanosis.  No visible retractions or increased work of breathing.              Time: 6 min

## 2022-02-15 DIAGNOSIS — J45.30 MILD PERSISTENT ASTHMA WITHOUT COMPLICATION: ICD-10-CM

## 2022-02-16 NOTE — TELEPHONE ENCOUNTER
Routing refill request to provider for review/approval because:  ACT Total Scores 10/9/2018 7/3/2019 1/27/2021   ACT TOTAL SCORE (Goal Greater than or Equal to 20) 22 17 23   In the past 12 months, how many times did you visit the emergency room for your asthma without being admitted to the hospital? 0 0 0   In the past 12 months, how many times were you hospitalized overnight because of your asthma? 0 0 0     ACT overdue sent via [employer], last ordered 2020.   Sophia MARSHALL RN  [facility]

## 2022-02-17 RX ORDER — ALBUTEROL SULFATE 90 UG/1
AEROSOL, METERED RESPIRATORY (INHALATION)
Qty: 18 G | Refills: 11 | Status: SHIPPED | OUTPATIENT
Start: 2022-02-17 | End: 2023-02-21

## 2022-03-07 ENCOUNTER — VIRTUAL VISIT (OUTPATIENT)
Dept: FAMILY MEDICINE | Facility: CLINIC | Age: 47
End: 2022-03-07
Payer: COMMERCIAL

## 2022-03-07 DIAGNOSIS — R10.84 ABDOMINAL PAIN, GENERALIZED: Primary | ICD-10-CM

## 2022-03-07 DIAGNOSIS — M10.9 GOUT, UNSPECIFIED CAUSE, UNSPECIFIED CHRONICITY, UNSPECIFIED SITE: ICD-10-CM

## 2022-03-07 PROCEDURE — 99214 OFFICE O/P EST MOD 30 MIN: CPT | Mod: 95 | Performed by: FAMILY MEDICINE

## 2022-03-07 NOTE — PROGRESS NOTES
"Yoandy is a 46 year old who is being evaluated via a billable video visit.      How would you like to obtain your AVS? MyChart  If the video visit is dropped, the invitation should be resent by: Text to cell phone: [phone]  Will anyone else be joining your video visit? No      Start Time: 9:55   Switched to phone visit due to connection issue     Assessment & Plan     Abdominal pain, generalized  Has worsening abd pain on RUQ to side, moving to lower and left side, aggravated with eating, has no blood in urine  Has past h/o gout  Will have him to recheck lab and adding UA and uric acid  Will also have him to check EGD and coloscopy for further evaluation   - UA with Microscopic reflex to Culture - lab collect; Future  - Comprehensive metabolic panel (BMP + Alb, Alk Phos, ALT, AST, Total. Bili, TP); Future  - Adult Gastro Ref - Procedure Only; Future    Gout, unspecified cause, unspecified chronicity, unspecified site  mentioned above   - UA with Microscopic reflex to Culture - lab collect; Future  - Comprehensive metabolic panel (BMP + Alb, Alk Phos, ALT, AST, Total. Bili, TP); Future  - Uric acid; Future         BMI:   Estimated body mass index is 27.26 kg/m  as calculated from the following:    Height as of 10/19/21: 1.829 m (6').    Weight as of 10/19/21: 91.2 kg (201 lb).   Weight management plan: Discussed healthy diet and exercise guidelines    FUTURE APPOINTMENTS:  Return in about 3 weeks (around 3/28/2022) for abd/side pain.    Zelalem Kwong MD  [facility]    Kia Pitt is a 46 year old who presents for the following health issues     History of Present Illness     Reason for visit:  Lower right abdominal \"sensation\"  Symptom onset:  More than a month  Symptoms include:  Mild pain  Symptom intensity:  Mild  Symptom progression:  Worsening  Had these symptoms before:  Yes  Has tried/received treatment for these symptoms:  No  What makes it worse:  None  What makes it " better:  None    He eats 2-3 servings of fruits and vegetables daily.He consumes 0 sweetened beverage(s) daily.He exercises with enough effort to increase his heart rate 20 to 29 minutes per day.  He exercises with enough effort to increase his heart rate 3 or less days per week.   He is taking medications regularly.             Review of Systems   Constitutional, HEENT, cardiovascular, pulmonary, gi and gu systems are negative, except as otherwise noted.      Objective           Vitals:  No vitals were obtained today due to virtual visit.    Physical Exam   GENERAL: Healthy, alert and no distress  EYES: Eyes grossly normal to inspection.  No discharge or erythema, or obvious scleral/conjunctival abnormalities.  RESP: No audible wheeze, cough, or visible cyanosis.  No visible retractions or increased work of breathing.    SKIN: Visible skin clear. No significant rash, abnormal pigmentation or lesions.  NEURO: Cranial nerves grossly intact.  Mentation and speech appropriate for age.  PSYCH: Mentation appears normal, affect normal/bright, judgement and insight intact, normal speech and appearance well-groomed.                Phone visit time    25 minutes

## 2022-03-09 ENCOUNTER — TELEPHONE (OUTPATIENT)
Dept: GASTROENTEROLOGY | Facility: CLINIC | Age: 47
End: 2022-03-09
Payer: COMMERCIAL

## 2022-03-09 ENCOUNTER — HOSPITAL ENCOUNTER (OUTPATIENT)
Facility: CLINIC | Age: 47
End: 2022-03-09
Attending: SPECIALIST | Admitting: SPECIALIST
Payer: COMMERCIAL

## 2022-03-09 ENCOUNTER — LAB (OUTPATIENT)
Dept: LAB | Facility: CLINIC | Age: 47
End: 2022-03-09
Payer: COMMERCIAL

## 2022-03-09 DIAGNOSIS — Z11.59 ENCOUNTER FOR SCREENING FOR OTHER VIRAL DISEASES: Primary | ICD-10-CM

## 2022-03-09 DIAGNOSIS — R10.84 ABDOMINAL PAIN, GENERALIZED: ICD-10-CM

## 2022-03-09 DIAGNOSIS — M10.9 GOUT, UNSPECIFIED CAUSE, UNSPECIFIED CHRONICITY, UNSPECIFIED SITE: ICD-10-CM

## 2022-03-09 LAB
ALBUMIN UR-MCNC: NEGATIVE MG/DL
APPEARANCE UR: CLEAR
BACTERIA #/AREA URNS HPF: ABNORMAL /HPF
BILIRUB UR QL STRIP: NEGATIVE
COLOR UR AUTO: YELLOW
GLUCOSE UR STRIP-MCNC: NEGATIVE MG/DL
HGB UR QL STRIP: NEGATIVE
KETONES UR STRIP-MCNC: NEGATIVE MG/DL
LEUKOCYTE ESTERASE UR QL STRIP: NEGATIVE
NITRATE UR QL: NEGATIVE
PH UR STRIP: 6 [PH] (ref 5–7)
RBC #/AREA URNS AUTO: ABNORMAL /HPF
SP GR UR STRIP: 1.01 (ref 1–1.03)
SQUAMOUS #/AREA URNS AUTO: ABNORMAL /LPF
UROBILINOGEN UR STRIP-ACNC: 0.2 E.U./DL
WBC #/AREA URNS AUTO: ABNORMAL /HPF

## 2022-03-09 PROCEDURE — 84550 ASSAY OF BLOOD/URIC ACID: CPT

## 2022-03-09 PROCEDURE — 81001 URINALYSIS AUTO W/SCOPE: CPT

## 2022-03-09 PROCEDURE — 80053 COMPREHEN METABOLIC PANEL: CPT

## 2022-03-09 PROCEDURE — 36415 COLL VENOUS BLD VENIPUNCTURE: CPT

## 2022-03-09 NOTE — TELEPHONE ENCOUNTER
Screening Questions  BlueKIND OF PREP RedLOCATION [review exclusion criteria] GreenSEDATION TYPE    1. Have you had a positive covid test in the last 90 days? Y, Jan. 6, 2022     2. Are you active on mychart? Y    3. What insurance is in the chart? [employer]     3.  Ordering/Referring Provider: Zelalem Kwong MD in [facility] FP/IM/PEDS    4. BMI 27.1 [BMI OVER 40-EXTENDED PREP]  If greater than 40 review exclusion criteria [PAC APPT IF @ [facility]]    5.  Respiratory Screening :  [If yes to any of the following HOSPITAL setting only]     Do you use daily home oxygen? N    Do you have mod to severe Obstructive Sleep Apnea? N  [OKAY @ [facility] SH PH RI]   Do you have Pulmonary Hypertension? N     Do you have UNCONTROLLED asthma? N      6. Have you had a heart or lung transplant? N      7. Are you currently on dialysis? N [ If yes, G-PREP & HOSPITAL setting only]     8. Do you have chronic kidney disease? N [ If yes, G-PREP ]    9. Have you had a stroke or Transient ischemic attack (TIA) within 6 months? N (If yes, do not schedule at [facility])    10. In the past 6 months, have you had any heart related issues including cardiomyopathy or heart attack? N        If yes, did it require cardiac stenting or other implantable device? N      11. Do you have any implantable devices in your body (pacemaker, defib, LVAD)? N (If yes, schedule at [facility])    12. Do you take nitroglycerin? N   If yes, how often? N  (if yes, HOSPITAL setting ONLY)    13. Are you currently taking any blood thinners? N   [IF YES, INFORM PATIENT TO FOLLOW UP W/ ORDERING PROVIDER FOR BRIDGING INSTRUCTIONS]     14. Are you a diabetic? N   [ If yes, G-PREP ]    15. [FEMALES] Are you currently pregnant?     If yes, how many weeks?     16. Are you taking any prescription pain medications on a routine schedule?  N  [ If yes, EXTENDED PREP.] [If yes, MAC]    17. Do you have any chemical dependencies such as alcohol, street drugs, or methadone?  N [If yes, MAC]    18. Do you have  any history of post-traumatic stress syndrome, severe anxiety or history of psychosis?  N  [If yes, MAC]    19. Do you transfer independently?  Y    20.  Do you have any issues with constipation?  N  [ If yes, EXTENDED PREP.]    21. Preferred LOCAL Pharmacy for Pre Prescription     [id number] - [place], [place] - [address] AT [facility] OF [place] [address]  [id number] - [place], [place] - [address] AT [facility] OF  & [address]  WRITTEN PRESCRIPTION REQUESTED  [id number] - [place], [place] - [address] [facility] [address] [place] [facility] [facility] [address]  Scheduling Details      Caller : Yoandy  (Please ask for phone number if not scheduled by patient)    Type of Procedure Scheduled: DBL  Which Colonoscopy Prep was Sent?: Miralax  KHORUTS CF PATIENTS & GROEN'S PATIENTS NEEDS EXTENDED PREP  Surgeon: Gabriel  Date of Procedure: 4/6  Location:       Sedation Type: CS  Conscious Sedation- Needs  for 6 hours after the procedure  MAC/General-Needs  for 24 hours after procedure    Pre-op Required at [facility], [place], [place] and OR for MAC sedation: n  (advise patient they will need a pre-op prior to procedure -)      Informed patient they will need an adult  Y  Cannot take any type of public or medical transportation alone    Pre-Procedure [facility] or Externally: KAROLINE 4/2    Confirmed Nurse will call to complete assessment y    Additional comments: none

## 2022-03-10 LAB
ALBUMIN SERPL-MCNC: 3.8 G/DL (ref 3.4–5)
ALP SERPL-CCNC: 69 U/L (ref 40–150)
ALT SERPL W P-5'-P-CCNC: 26 U/L (ref 0–70)
ANION GAP SERPL CALCULATED.3IONS-SCNC: 8 MMOL/L (ref 3–14)
AST SERPL W P-5'-P-CCNC: 15 U/L (ref 0–45)
BILIRUB SERPL-MCNC: 0.7 MG/DL (ref 0.2–1.3)
BUN SERPL-MCNC: 18 MG/DL (ref 7–30)
CALCIUM SERPL-MCNC: 9.5 MG/DL (ref 8.5–10.1)
CHLORIDE BLD-SCNC: 104 MMOL/L (ref 94–109)
CO2 SERPL-SCNC: 25 MMOL/L (ref 20–32)
CREAT SERPL-MCNC: 1.23 MG/DL (ref 0.66–1.25)
GFR SERPL CREATININE-BSD FRML MDRD: 73 ML/MIN/1.73M2
GLUCOSE BLD-MCNC: 86 MG/DL (ref 70–99)
POTASSIUM BLD-SCNC: 4.2 MMOL/L (ref 3.4–5.3)
PROT SERPL-MCNC: 7.2 G/DL (ref 6.8–8.8)
SODIUM SERPL-SCNC: 137 MMOL/L (ref 133–144)
URATE SERPL-MCNC: 5.5 MG/DL (ref 3.5–7.2)

## 2022-03-31 ENCOUNTER — TELEPHONE (OUTPATIENT)
Dept: GASTROENTEROLOGY | Facility: CLINIC | Age: 47
End: 2022-03-31
Payer: COMMERCIAL

## 2022-03-31 NOTE — TELEPHONE ENCOUNTER
Caller: ole    Procedure: double    Date, Location, and Surgeon of Procedure Cancelled: 4/6 lu[facility] anne    Ordering Provider:Zelalem Kwong MD    Reason for cancel (please be detailed, any staff messages or encounters to note?): Gabriel is out of office  that day.         Rescheduled: No... lvm to call back to reschedule. Case has been canceled

## 2022-04-01 ENCOUNTER — TELEPHONE (OUTPATIENT)
Dept: GASTROENTEROLOGY | Facility: CLINIC | Age: 47
End: 2022-04-01
Payer: COMMERCIAL

## 2022-04-01 DIAGNOSIS — Z11.59 ENCOUNTER FOR SCREENING FOR OTHER VIRAL DISEASES: Primary | ICD-10-CM

## 2022-04-17 ENCOUNTER — HEALTH MAINTENANCE LETTER (OUTPATIENT)
Age: 47
End: 2022-04-17

## 2022-04-28 ENCOUNTER — TELEPHONE (OUTPATIENT)
Dept: FAMILY MEDICINE | Facility: CLINIC | Age: 47
End: 2022-04-28

## 2022-04-28 NOTE — TELEPHONE ENCOUNTER
Patient Quality Outreach    Patient is due for the following:   Asthma  -  ACT needed  Colon Cancer Screening -  Colonoscopy  Physical  - Due Now     NEXT STEPS:   Schedule a yearly physical    Type of outreach:    Sent [employer] message.      Questions for provider review:    None     Ruba Stone, [facility]

## 2022-04-30 ENCOUNTER — LAB (OUTPATIENT)
Dept: URGENT CARE | Facility: URGENT CARE | Age: 47
End: 2022-04-30
Payer: COMMERCIAL

## 2022-04-30 DIAGNOSIS — Z11.59 ENCOUNTER FOR SCREENING FOR OTHER VIRAL DISEASES: ICD-10-CM

## 2022-04-30 PROCEDURE — U0005 INFEC AGEN DETEC AMPLI PROBE: HCPCS

## 2022-04-30 PROCEDURE — U0003 INFECTIOUS AGENT DETECTION BY NUCLEIC ACID (DNA OR RNA); SEVERE ACUTE RESPIRATORY SYNDROME CORONAVIRUS 2 (SARS-COV-2) (CORONAVIRUS DISEASE [COVID-19]), AMPLIFIED PROBE TECHNIQUE, MAKING USE OF HIGH THROUGHPUT TECHNOLOGIES AS DESCRIBED BY CMS-2020-01-R: HCPCS

## 2022-05-01 LAB — SARS-COV-2 RNA RESP QL NAA+PROBE: NEGATIVE

## 2022-05-03 ENCOUNTER — HOSPITAL ENCOUNTER (OUTPATIENT)
Facility: CLINIC | Age: 47
Discharge: HOME | End: 2022-05-03
Attending: INTERNAL MEDICINE | Admitting: INTERNAL MEDICINE
Payer: COMMERCIAL

## 2022-05-03 VITALS
RESPIRATION RATE: 26 BRPM | BODY MASS INDEX: 26.41 KG/M2 | HEIGHT: 72 IN | HEART RATE: 80 BPM | DIASTOLIC BLOOD PRESSURE: 79 MMHG | OXYGEN SATURATION: 99 % | WEIGHT: 195 LBS | SYSTOLIC BLOOD PRESSURE: 107 MMHG

## 2022-05-03 LAB
COLONOSCOPY: NORMAL
UPPER GI ENDOSCOPY: NORMAL

## 2022-05-03 PROCEDURE — 99153 MOD SED SAME PHYS/QHP EA: CPT | Performed by: INTERNAL MEDICINE

## 2022-05-03 PROCEDURE — 88305 TISSUE EXAM BY PATHOLOGIST: CPT | Mod: TC | Performed by: INTERNAL MEDICINE

## 2022-05-03 PROCEDURE — G0500 MOD SEDAT ENDO SERVICE >5YRS: HCPCS | Performed by: INTERNAL MEDICINE

## 2022-05-03 PROCEDURE — 43239 EGD BIOPSY SINGLE/MULTIPLE: CPT | Performed by: INTERNAL MEDICINE

## 2022-05-03 PROCEDURE — 88305 TISSUE EXAM BY PATHOLOGIST: CPT | Mod: 26 | Performed by: PATHOLOGY

## 2022-05-03 PROCEDURE — 250N000011 HC RX IP 250 OP 636: Performed by: INTERNAL MEDICINE

## 2022-05-03 PROCEDURE — 45385 COLONOSCOPY W/LESION REMOVAL: CPT | Mod: PT | Performed by: INTERNAL MEDICINE

## 2022-05-03 PROCEDURE — 258N000003 HC RX IP 258 OP 636: Performed by: INTERNAL MEDICINE

## 2022-05-03 RX ORDER — LIDOCAINE 40 MG/G
CREAM TOPICAL
Status: DISCONTINUED | OUTPATIENT
Start: 2022-05-03 | End: 2022-05-03 | Stop reason: HOSPADM

## 2022-05-03 RX ORDER — SODIUM CHLORIDE, SODIUM LACTATE, POTASSIUM CHLORIDE, CALCIUM CHLORIDE 600; 310; 30; 20 MG/100ML; MG/100ML; MG/100ML; MG/100ML
INJECTION, SOLUTION INTRAVENOUS CONTINUOUS
Status: DISCONTINUED | OUTPATIENT
Start: 2022-05-03 | End: 2022-05-03 | Stop reason: HOSPADM

## 2022-05-03 RX ORDER — FENTANYL CITRATE 50 UG/ML
INJECTION, SOLUTION INTRAMUSCULAR; INTRAVENOUS PRN
Status: COMPLETED | OUTPATIENT
Start: 2022-05-03 | End: 2022-05-03

## 2022-05-03 RX ORDER — ONDANSETRON 2 MG/ML
4 INJECTION INTRAMUSCULAR; INTRAVENOUS
Status: DISCONTINUED | OUTPATIENT
Start: 2022-05-03 | End: 2022-05-03 | Stop reason: HOSPADM

## 2022-05-03 RX ADMIN — MIDAZOLAM 2 MG: 1 INJECTION INTRAMUSCULAR; INTRAVENOUS at 12:04

## 2022-05-03 RX ADMIN — FENTANYL CITRATE 25 MCG: 50 INJECTION, SOLUTION INTRAMUSCULAR; INTRAVENOUS at 12:05

## 2022-05-03 RX ADMIN — SODIUM CHLORIDE 500 ML: 9 INJECTION, SOLUTION INTRAVENOUS at 13:10

## 2022-05-03 RX ADMIN — MIDAZOLAM 1 MG: 1 INJECTION INTRAMUSCULAR; INTRAVENOUS at 12:08

## 2022-05-03 RX ADMIN — MIDAZOLAM 2 MG: 1 INJECTION INTRAMUSCULAR; INTRAVENOUS at 12:06

## 2022-05-03 RX ADMIN — FENTANYL CITRATE 100 MCG: 50 INJECTION, SOLUTION INTRAMUSCULAR; INTRAVENOUS at 12:03

## 2022-05-03 RX ADMIN — FENTANYL CITRATE 25 MCG: 50 INJECTION, SOLUTION INTRAMUSCULAR; INTRAVENOUS at 12:07

## 2022-05-03 NOTE — H&P
Henrik Almanzar  [id number]  male  47 year old      Reason for procedure/surgery: Abdominal pain    Patient Active Problem List   Diagnosis     Anxiety state     Panic disorder without agoraphobia     Allergic rhinitis     HYPERLIPIDEMIA LDL GOAL <160     Mild intermittent asthma without complication       Past Surgical History:    Past Surgical History:   Procedure Laterality Date     HC REMOVAL OF TONSILS,<13 Y/O         Past Medical History:   Past Medical History:   Diagnosis Date     ANXIETY STATE NOS 2003     ASTHMA - MILD PERSISTENT 2005     Hypertension      PANIC DISORDER 2003       Social History:   Social History     Tobacco Use     Smoking status: Former Smoker     Quit date: 2003     Years since quittin.6     Smokeless tobacco: Never Used   Substance Use Topics     Alcohol use: Not Currently       Family History:   Family History   Problem Relation Age of Onset     Hypertension Father      Lipids Father      Substance Abuse Father      Cancer Paternal Aunt         cancer, unknown type     Cancer Paternal Grandfather         lung cancer     Unknown/Adopted Paternal Grandfather      Cerebrovascular Disease Paternal Uncle         Great uncle     Depression Mother      Anxiety Disorder Mother      Unknown/Adopted Maternal Grandmother      Unknown/Adopted Maternal Grandfather      Unknown/Adopted Paternal Grandmother      No Known Problems Brother      Substance Abuse Sister      No Known Problems Son      No Known Problems Daughter      No Known Problems Other      Schizophrenia No family hx of      Bipolar Disorder No family hx of      Suicide No family hx of      Dementia No family hx of      Kamar Disease No family hx of      Parkinsonism No family hx of      Autism Spectrum Disorder No family hx of      Intellectual Disability (Mental Retardation) No family hx of      Mental Illness No family hx of        Allergies:   Allergies   Allergen Reactions     Cats      Dogs       No Known Drug Allergies        Active Medications:   No current outpatient medications on file.       Systemic Review:   CONSTITUTIONAL: NEGATIVE for fever, chills, change in weight  ENT/MOUTH: NEGATIVE for ear, mouth and throat problems  RESP: NEGATIVE for significant cough or SOB  CV: NEGATIVE for chest pain, palpitations or peripheral edema    Physical Examination:   Vital Signs: BP (!) 143/89   Resp 16   Ht 1.829 m (6')   Wt 88.5 kg (195 lb)   SpO2 97%   BMI 26.45 kg/m    GENERAL: healthy, alert and no distress  NECK: no adenopathy, no asymmetry, masses, or scars  RESP: lungs clear to auscultation - no rales, rhonchi or wheezes  CV: regular rate and rhythm, normal S1 S2, no S3 or S4, no murmur, click or rub, no peripheral edema and peripheral pulses strong  ABDOMEN: soft, nontender, no hepatosplenomegaly, no masses and bowel sounds normal  MS: no gross musculoskeletal defects noted, no edema    ASA: 2    Mallampati Score: 2     Plan: Appropriate to proceed as scheduled.      Jorge Nance MD  5/3/2022    PCP:  Declan You

## 2022-05-03 NOTE — OR NURSING
Sitting BP post endoscopy procedures 90/50.  Patient feeling sweaty and looking pasty.  IV fluids Normal Saline started.  Positioned supine with with cool cloth to forehead.

## 2022-05-04 LAB
PATH REPORT.COMMENTS IMP SPEC: NORMAL
PATH REPORT.COMMENTS IMP SPEC: NORMAL
PATH REPORT.FINAL DX SPEC: NORMAL
PATH REPORT.GROSS SPEC: NORMAL
PATH REPORT.MICROSCOPIC SPEC OTHER STN: NORMAL
PATH REPORT.RELEVANT HX SPEC: NORMAL
PHOTO IMAGE: NORMAL

## 2022-05-24 ENCOUNTER — VIRTUAL VISIT (OUTPATIENT)
Dept: GASTROENTEROLOGY | Facility: CLINIC | Age: 47
End: 2022-05-24
Attending: FAMILY MEDICINE
Payer: COMMERCIAL

## 2022-05-24 VITALS — WEIGHT: 195 LBS | BODY MASS INDEX: 26.45 KG/M2

## 2022-05-24 DIAGNOSIS — R10.12 ABDOMINAL PAIN, LEFT UPPER QUADRANT: ICD-10-CM

## 2022-05-24 DIAGNOSIS — R10.13 DYSPEPSIA: Primary | ICD-10-CM

## 2022-05-24 DIAGNOSIS — K29.50 CHRONIC GASTRITIS WITHOUT BLEEDING, UNSPECIFIED GASTRITIS TYPE: ICD-10-CM

## 2022-05-24 PROCEDURE — 99203 OFFICE O/P NEW LOW 30 MIN: CPT | Mod: GC | Performed by: STUDENT IN AN ORGANIZED HEALTH CARE EDUCATION/TRAINING PROGRAM

## 2022-05-24 ASSESSMENT — PAIN SCALES - GENERAL: PAINLEVEL: NO PAIN (0)

## 2022-05-24 NOTE — PROGRESS NOTES
Yoandy is a 47 year old who is being evaluated via a billable video visit.      How would you like to obtain your AVS? [employer]  If the video visit is dropped, the invitation should be resent by: Send to e-mail at: [email or web address]  Will anyone else be joining your video visit? No      Video Start Time: 2.50 PM    Video-Visit Details    Type of service:  Video Visit    Video End Time: 2.30 PM  Originating Location (pt. Location): Home    Distant Location (provider location):  [facility] [place]     Platform used for Video Visit: [employer]

## 2022-05-24 NOTE — PATIENT INSTRUCTIONS
Your redness in stomach with biopsy show chronic mild gastritis is likely related to alcohol use in the past, and likely causing the abdominal pain. The pain you have is consistent with condition called dyspepsia. This is a benign reversible condition.     - No additional management or medication needed  - If has recurrent symptom, pain with food/burning pain, can obtain over the counter proton pump inhibitor (omeprazole, pantoprazole, esomeprazole) for 2-4 weeks for symptoms control.

## 2022-05-24 NOTE — LETTER
5/24/2022         RE: Henrik Almanzar  [address]  [place] [address]        Dear Colleague,    Thank you for referring your patient, Henrik Almanzar, to the [facility] [place]. Please see a copy of my visit note below.    GI CLINIC VISIT    CC/REFERRING MD:    REASON FOR CONSULTATION:     ASSESSMENT/PLAN: 47 years old male with chronic mid abdominal pain.    # Dyspepsia, improving  # Chronic gastritis on EGD, negative for H.pylori  Patient with 2 years history of abdominal pain related to food and alcohol, improved minimally with bowel movement. EGD 5/2022 with chronic gastritis. Likely related to alcohol use in the past. Pain now improved with cessation of alcohol. IF worsening again, can do trial of PPI for 1-2 months.    Plan  - no further management/medication needed. Agree with cessation of alcohol and minimize use of NSAID.  - If has recurrent symptom, pain with food/burning pain, can obtain over the counter proton pump inhibitor (omeprazole, pantoprazole, esomeprazole) for 2-4 weeks for symptoms control.    RTC: No follow-ups on file.     Patient is seen and discussed with Dr. Concepción Burk.    30 minutes spent on the date of the encounter performing chart review, history and exam, documentation and further activities as noted above.  .  Aby Stewart MD  GI fellow  Page [phone]    HPI 46 yo male with gout, mild asthma, who is here for first visit for chronicabdominal pain for 2 years.    Rt mid abdomen 3 inches from umbilicus, once in a while will feel left abdomen. Constant pain. Worsening related to meal within a few minutes and alcohol. Some improvement when having a bowel movement.     Mild bloating. no nausea vomiting. No weight loss, No dysphagia/odynophagia.    Bowel movement once to twice a day, formed stool.     Stop sugar and alcohol for 8 months which makes the abdominal much better now. Has prior history of binge alcohol  drinking of 24 beers a week. Ibuprofen for headache 1 pill, every twice a month.     Was recently seen at urgent care 3/15/22 for abdominal pain. CT normal. Also seen by PCP, labs and US RUQ were unremarkable.    PREVIOUS ENDOSCOPY:  EGD 5/3/22: - Normal esophagus.                             - Esophagogastric landmarks identified.                             - Erythematous mucosa in the stomach. Biopsied.                             - Erythematous duodenopathy. Biopsied.                             - Gastroesophageal flap valve classified as Hill                             Grade III (minimal fold, loose to endoscope, hiatal                             hernia likely).     Colonoscopy 5/3/22:   A(1). Duodenum, biopsy:  -Small intestinal mucosa with no significant histopathologic abnormalities.  -Normal villous architecture identified and no prominence in intraepithelial lymphocytes seen.  -Negative for luminal organisms.  -Negative for dysplasia or malignancy.     B(2). Stomach, biopsy:  -Transitional and oxyntic type gastric mucosa with mild chronic inflammation.  - Negative for H. Pylori organisms on routine stains.  - Negative for intestinal metaplasia.   -Negative for dysplasia or malignancy     C(3).  Colon, Ascending, polyps x2, polypectomy:  -Sessile serrated adenoma  -Negative for conventional dysplasia and malignancy  -Separate serrated polyp most consistent with hyperplastic polyp  -Negative for dysplasia or malignancy.  ROS:    No fevers or chills  No weight loss  No blurry vision, double vision or change in vision  No sore throat  No lymphadenopathy  Mild rare headache, No paraesthesias, or weakness in a limb  No shortness of breath or wheezing  No chest pain or pressure  No arthralgias or myalgias  No rashes or skin changes  No odynophagia or dysphagia  No BRBPR, hematochezia, melena  No dysuria, frequency or urgency  No hot/cold intolerance or polyria  No anxiety or depression    PROBLEM LIST  Patient  Active Problem List    Diagnosis Date Noted     Mild intermittent asthma without complication 10/09/2018     Priority: Medium     HYPERLIPIDEMIA LDL GOAL <160 10/31/2010     Priority: Medium     Allergic rhinitis 05/23/2008     Priority: Medium     Problem list name updated by automated process. Provider to review       Anxiety state 11/07/2003     Priority: Medium     Problem list name updated by automated process. Provider to review       Panic disorder without agoraphobia 11/07/2003     Priority: Medium       PERTINENT PAST MEDICAL HISTORY:  Past Medical History:   Diagnosis Date     ANXIETY STATE NOS 11/07/2003     ASTHMA - MILD PERSISTENT 11/11/2005     Hypertension      PANIC DISORDER 11/07/2003       PREVIOUS SURGERIES:  Past Surgical History:   Procedure Laterality Date     COLONOSCOPY N/A 5/3/2022    Procedure: COLONOSCOPY, FLEXIBLE, WITH LESION REMOVAL USING SNARE;  Surgeon: Jorge Nance MD;  Location:  GI     ESOPHAGOSCOPY, GASTROSCOPY, DUODENOSCOPY (EGD), COMBINED N/A 5/3/2022    Procedure: ESOPHAGOGASTRODUODENOSCOPY, WITH BIOPSY;  Surgeon: Jorge Nance MD;  Location:  GI     HC REMOVAL OF TONSILS,<13 Y/O         ALLERGIES:  Allergies   Allergen Reactions     Cats      Dogs      No Known Drug Allergies        PERTINENT MEDICATIONS:  Current Outpatient Medications:      albuterol (PROAIR HFA/PROVENTIL HFA/VENTOLIN HFA) 108 (90 Base) MCG/ACT inhaler, INHALE 2 PUFFS INTO THE LUNGS EVERY 6 HOURS AS NEEDED FOR WHEEZING AND SHORTNESS OF BREATH, Disp: 18 g, Rfl: 11     atorvastatin (LIPITOR) 20 MG tablet, Take 1 tablet (20 mg) by mouth daily, Disp: 90 tablet, Rfl: 3     fluticasone-salmeterol (AIRDUO RESPICLICK) 113-14 MCG/ACT inhaler, INHALE 1 PUFF BY MOUTH INTO THE LUNGS TWICE DAILY, Disp: 1 each, Rfl: 3     MULTI-VITAMIN OR TABS, one tablet daily, Disp: 30, Rfl: 0    SOCIAL HISTORY:  Social History     Socioeconomic History     Marital status: Single     Spouse name: Not on file     Number of  children: Not on file     Years of education: Not on file     Highest education level: Not on file   Occupational History     Not on file   Tobacco Use     Smoking status: Former Smoker     Quit date: 2003     Years since quittin.7     Smokeless tobacco: Never Used   Vaping Use     Vaping Use: Never used   Substance and Sexual Activity     Alcohol use: Not Currently     Drug use: Yes     Types: Marijuana     Sexual activity: Yes     Partners: Female     Comment: condom's   Other Topics Concern      Service No     Blood Transfusions No     Caffeine Concern No     Occupational Exposure Not Asked     Hobby Hazards Not Asked     Sleep Concern No     Stress Concern No     Weight Concern No     Special Diet No     Back Care Yes     Comment: lower back pain, managed through chiropractor     Exercise Yes     Comment: biking, weights     Bike Helmet Not Asked     Seat Belt Not Asked     Self-Exams Not Asked     Parent/sibling w/ CABG, MI or angioplasty before 65F 55M? Not Asked   Social History Narrative     Not on file     Social Determinants of Health     Financial Resource Strain: Not on file   Food Insecurity: Not on file   Transportation Needs: Not on file   Physical Activity: Not on file   Stress: Not on file   Social Connections: Not on file   Intimate Partner Violence: Not on file   Housing Stability: Not on file   - no smoking, quitted 6 years.   - no alcohol quitted 8 months. A 24 beers a week.   - son 8 years old.   - working in sales, door-dashing    FAMILY HISTORY:  - lung cancer in grandmother    Family History   Problem Relation Age of Onset     Hypertension Father      Lipids Father      Substance Abuse Father      Cancer Paternal Aunt         cancer, unknown type     Cancer Paternal Grandfather         lung cancer     Unknown/Adopted Paternal Grandfather      Cerebrovascular Disease Paternal Uncle         Great uncle     Depression Mother      Anxiety Disorder Mother      Unknown/Adopted  Maternal Grandmother      Unknown/Adopted Maternal Grandfather      Unknown/Adopted Paternal Grandmother      No Known Problems Brother      Substance Abuse Sister      No Known Problems Son      No Known Problems Daughter      No Known Problems Other      Schizophrenia No family hx of      Bipolar Disorder No family hx of      Suicide No family hx of      Dementia No family hx of      Swift Disease No family hx of      Parkinsonism No family hx of      Autism Spectrum Disorder No family hx of      Intellectual Disability (Mental Retardation) No family hx of      Mental Illness No family hx of      PHYSICAL EXAMINATION:  Constitutional: aaox3, cooperative, pleasant, not dyspneic/diaphoretic, no acute distress  Vitals reviewed: There were no vitals taken for this visit.  Wt:   Wt Readings from Last 2 Encounters:   05/03/22 88.5 kg (195 lb)   10/19/21 91.2 kg (201 lb)    Constitutional - general appearance is well and in no acute distress. Body habitus normal  Eyes - No redness or discharge  Respiratory - No cough, unlabored breathing  Musculoskeletal - range of motion intact: Neck and arms  Skin - No discoloration or lesions  Neurological - No tremors, headaches  Psychiatric - No anxiety, alert & oriented    PERTINENT STUDIES:  US abd RUQ 10/2021: normal gallbladder, liver. CBD 3 mm.    CT abd/pel 3/2022 with contrast normal    Sincerely,    Aby Stewart MD

## 2022-05-24 NOTE — PROGRESS NOTES
GI CLINIC VISIT    CC/REFERRING MD:    REASON FOR CONSULTATION:     ASSESSMENT/PLAN: 47 years old male with chronic mid abdominal pain.    # Dyspepsia, improving  # Chronic gastritis on EGD, negative for H.pylori  Patient with 2 years history of abdominal pain related to food and alcohol, improved minimally with bowel movement. EGD 5/2022 with chronic gastritis. Likely related to alcohol use in the past. Pain now improved with cessation of alcohol. IF worsening again, can do trial of PPI for 1-2 months.    Plan  - no further management/medication needed. Agree with cessation of alcohol and minimize use of NSAID.  - If has recurrent symptom, pain with food/burning pain, can obtain over the counter proton pump inhibitor (omeprazole, pantoprazole, esomeprazole) for 2-4 weeks for symptoms control.    RTC: No follow-ups on file.     Patient is seen and discussed with Dr. Concepción Burk.    30 minutes spent on the date of the encounter performing chart review, history and exam, documentation and further activities as noted above.  .  Aby Stewart MD  GI fellow  Page [phone]    HPI 48 yo male with gout, mild asthma, who is here for first visit for chronicabdominal pain for 2 years.    Rt mid abdomen 3 inches from umbilicus, once in a while will feel left abdomen. Constant pain. Worsening related to meal within a few minutes and alcohol. Some improvement when having a bowel movement.     Mild bloating. no nausea vomiting. No weight loss, No dysphagia/odynophagia.    Bowel movement once to twice a day, formed stool.     Stop sugar and alcohol for 8 months which makes the abdominal much better now. Has prior history of binge alcohol drinking of 24 beers a week. Ibuprofen for headache 1 pill, every twice a month.     Was recently seen at urgent care 3/15/22 for abdominal pain. CT normal. Also seen by PCP, labs and US RUQ were unremarkable.    PREVIOUS ENDOSCOPY:  EGD 5/3/22: - Normal esophagus.                              - Esophagogastric landmarks identified.                             - Erythematous mucosa in the stomach. Biopsied.                             - Erythematous duodenopathy. Biopsied.                             - Gastroesophageal flap valve classified as Hill                             Grade III (minimal fold, loose to endoscope, hiatal                             hernia likely).     Colonoscopy 5/3/22:   A(1). Duodenum, biopsy:  -Small intestinal mucosa with no significant histopathologic abnormalities.  -Normal villous architecture identified and no prominence in intraepithelial lymphocytes seen.  -Negative for luminal organisms.  -Negative for dysplasia or malignancy.     B(2). Stomach, biopsy:  -Transitional and oxyntic type gastric mucosa with mild chronic inflammation.  - Negative for H. Pylori organisms on routine stains.  - Negative for intestinal metaplasia.   -Negative for dysplasia or malignancy     C(3).  Colon, Ascending, polyps x2, polypectomy:  -Sessile serrated adenoma  -Negative for conventional dysplasia and malignancy  -Separate serrated polyp most consistent with hyperplastic polyp  -Negative for dysplasia or malignancy.    ROS:    No fevers or chills  No weight loss  No blurry vision, double vision or change in vision  No sore throat  No lymphadenopathy  Mild rare headache, No paraesthesias, or weakness in a limb  No shortness of breath or wheezing  No chest pain or pressure  No arthralgias or myalgias  No rashes or skin changes  No odynophagia or dysphagia  No BRBPR, hematochezia, melena  No dysuria, frequency or urgency  No hot/cold intolerance or polyria  No anxiety or depression    PROBLEM LIST  Patient Active Problem List    Diagnosis Date Noted     Mild intermittent asthma without complication 10/09/2018     Priority: Medium     HYPERLIPIDEMIA LDL GOAL <160 10/31/2010     Priority: Medium     Allergic rhinitis 05/23/2008     Priority: Medium     Problem list name updated by  automated process. Provider to review       Anxiety state 2003     Priority: Medium     Problem list name updated by automated process. Provider to review       Panic disorder without agoraphobia 2003     Priority: Medium       PERTINENT PAST MEDICAL HISTORY:  Past Medical History:   Diagnosis Date     ANXIETY STATE NOS 2003     ASTHMA - MILD PERSISTENT 2005     Hypertension      PANIC DISORDER 2003       PREVIOUS SURGERIES:  Past Surgical History:   Procedure Laterality Date     COLONOSCOPY N/A 5/3/2022    Procedure: COLONOSCOPY, FLEXIBLE, WITH LESION REMOVAL USING SNARE;  Surgeon: Jorge Nance MD;  Location:  GI     ESOPHAGOSCOPY, GASTROSCOPY, DUODENOSCOPY (EGD), COMBINED N/A 5/3/2022    Procedure: ESOPHAGOGASTRODUODENOSCOPY, WITH BIOPSY;  Surgeon: Jorge Nance MD;  Location:  GI     HC REMOVAL OF TONSILS,<13 Y/O         ALLERGIES:     Allergies   Allergen Reactions     Cats      Dogs      No Known Drug Allergies        PERTINENT MEDICATIONS:    Current Outpatient Medications:      albuterol (PROAIR HFA/PROVENTIL HFA/VENTOLIN HFA) 108 (90 Base) MCG/ACT inhaler, INHALE 2 PUFFS INTO THE LUNGS EVERY 6 HOURS AS NEEDED FOR WHEEZING AND SHORTNESS OF BREATH, Disp: 18 g, Rfl: 11     atorvastatin (LIPITOR) 20 MG tablet, Take 1 tablet (20 mg) by mouth daily, Disp: 90 tablet, Rfl: 3     fluticasone-salmeterol (AIRDUO RESPICLICK) 113-14 MCG/ACT inhaler, INHALE 1 PUFF BY MOUTH INTO THE LUNGS TWICE DAILY, Disp: 1 each, Rfl: 3     MULTI-VITAMIN OR TABS, one tablet daily, Disp: 30, Rfl: 0    SOCIAL HISTORY:  Social History     Socioeconomic History     Marital status: Single     Spouse name: Not on file     Number of children: Not on file     Years of education: Not on file     Highest education level: Not on file   Occupational History     Not on file   Tobacco Use     Smoking status: Former Smoker     Quit date: 2003     Years since quittin.7     Smokeless tobacco: Never Used    Vaping Use     Vaping Use: Never used   Substance and Sexual Activity     Alcohol use: Not Currently     Drug use: Yes     Types: Marijuana     Sexual activity: Yes     Partners: Female     Comment: condom's   Other Topics Concern      Service No     Blood Transfusions No     Caffeine Concern No     Occupational Exposure Not Asked     Hobby Hazards Not Asked     Sleep Concern No     Stress Concern No     Weight Concern No     Special Diet No     Back Care Yes     Comment: lower back pain, managed through chiropractor     Exercise Yes     Comment: biking, weights     Bike Helmet Not Asked     Seat Belt Not Asked     Self-Exams Not Asked     Parent/sibling w/ CABG, MI or angioplasty before 65F 55M? Not Asked   Social History Narrative     Not on file     Social Determinants of Health     Financial Resource Strain: Not on file   Food Insecurity: Not on file   Transportation Needs: Not on file   Physical Activity: Not on file   Stress: Not on file   Social Connections: Not on file   Intimate Partner Violence: Not on file   Housing Stability: Not on file   - no smoking, quitted 6 years.   - no alcohol quitted 8 months. A 24 beers a week.   - son 8 years old.   - working in sales, door-dashing    FAMILY HISTORY:  - lung cancer in grandmother    Family History   Problem Relation Age of Onset     Hypertension Father      Lipids Father      Substance Abuse Father      Cancer Paternal Aunt         cancer, unknown type     Cancer Paternal Grandfather         lung cancer     Unknown/Adopted Paternal Grandfather      Cerebrovascular Disease Paternal Uncle         Great uncle     Depression Mother      Anxiety Disorder Mother      Unknown/Adopted Maternal Grandmother      Unknown/Adopted Maternal Grandfather      Unknown/Adopted Paternal Grandmother      No Known Problems Brother      Substance Abuse Sister      No Known Problems Son      No Known Problems Daughter      No Known Problems Other      Schizophrenia No  family hx of      Bipolar Disorder No family hx of      Suicide No family hx of      Dementia No family hx of      [place] Disease No family hx of      Parkinsonism No family hx of      Autism Spectrum Disorder No family hx of      Intellectual Disability (Mental Retardation) No family hx of      Mental Illness No family hx of      PHYSICAL EXAMINATION:  Constitutional: aaox3, cooperative, pleasant, not dyspneic/diaphoretic, no acute distress  Vitals reviewed: There were no vitals taken for this visit.  Wt:   Wt Readings from Last 2 Encounters:   05/03/22 88.5 kg (195 lb)   10/19/21 91.2 kg (201 lb)    Constitutional - general appearance is well and in no acute distress. Body habitus normal  Eyes - No redness or discharge  Respiratory - No cough, unlabored breathing  Musculoskeletal - range of motion intact: Neck and arms  Skin - No discoloration or lesions  Neurological - No tremors, headaches  Psychiatric - No anxiety, alert & oriented    PERTINENT STUDIES:  US abd RUQ 10/2021: normal gallbladder, liver. CBD 3 mm.    CT abd/pel 3/2022 with contrast normal

## 2022-06-06 ENCOUNTER — MYC MEDICAL ADVICE (OUTPATIENT)
Dept: FAMILY MEDICINE | Facility: CLINIC | Age: 47
End: 2022-06-06
Payer: COMMERCIAL

## 2022-06-08 ASSESSMENT — ASTHMA QUESTIONNAIRES: ACT_TOTALSCORE: 22

## 2022-07-04 DIAGNOSIS — J45.30 MILD PERSISTENT ASTHMA WITHOUT COMPLICATION: ICD-10-CM

## 2022-07-06 NOTE — TELEPHONE ENCOUNTER
Routing refill request to provider for review/approval because:   Long-Acting Beta Agonist Inhalers Protocol  Failed 07/04/2022 03:36 AM   Protocol Details  Order for Serevent, Striverdi, or Foradil and pt has steroid inhaler          Kathleen Glover RN

## 2022-07-07 RX ORDER — FLUTICASONE PROPIONATE AND SALMETEROL 113; 14 UG/1; UG/1
POWDER, METERED RESPIRATORY (INHALATION)
Qty: 1 EACH | Refills: 0 | Status: SHIPPED | OUTPATIENT
Start: 2022-07-07 | End: 2022-10-14

## 2022-10-23 ENCOUNTER — HEALTH MAINTENANCE LETTER (OUTPATIENT)
Age: 47
End: 2022-10-23

## 2022-11-20 ENCOUNTER — E-VISIT (OUTPATIENT)
Dept: URGENT CARE | Facility: CLINIC | Age: 47
End: 2022-11-20
Payer: MEDICAID

## 2022-11-20 DIAGNOSIS — Z53.9 DIAGNOSIS NOT YET DEFINED: Primary | ICD-10-CM

## 2022-11-20 NOTE — PATIENT INSTRUCTIONS
Dear Henrik Almanzar,    We are sorry you are not feeling well. Based on the responses you provided, it is recommended that you be seen in-person in urgent care so we can better evaluate your symptoms. Please click here to find the nearest urgent care location to you.   You will not be charged for this Visit. Thank you for trusting us with your care.    Kennedi Willoughby PA-C

## 2022-11-21 ENCOUNTER — VIRTUAL VISIT (OUTPATIENT)
Dept: FAMILY MEDICINE | Facility: CLINIC | Age: 47
End: 2022-11-21
Payer: MEDICAID

## 2022-11-21 DIAGNOSIS — J20.9 ACUTE BRONCHITIS, UNSPECIFIED ORGANISM: Primary | ICD-10-CM

## 2022-11-21 DIAGNOSIS — J45.20 MILD INTERMITTENT ASTHMA WITHOUT COMPLICATION: ICD-10-CM

## 2022-11-21 PROCEDURE — 99213 OFFICE O/P EST LOW 20 MIN: CPT | Mod: 95 | Performed by: PHYSICIAN ASSISTANT

## 2022-11-21 RX ORDER — DOXYCYCLINE HYCLATE 100 MG
100 TABLET ORAL 2 TIMES DAILY
Qty: 20 TABLET | Refills: 0 | Status: SHIPPED | OUTPATIENT
Start: 2022-11-21 | End: 2022-12-01

## 2022-11-21 NOTE — PROGRESS NOTES
Yoandy is a 47 year old who is being evaluated via a billable video visit.      How would you like to obtain your AVS? MyChart  If the video visit is dropped, the invitation should be resent by: Text to cell phone: [phone]  Will anyone else be joining your video visit? No          Assessment & Plan       ICD-10-CM    1. Acute bronchitis, unspecified organism  J20.9 doxycycline hyclate (VIBRA-TABS) 100 MG tablet      2. Mild intermittent asthma without complication  J45.20           1) Doxycycline 100mg BID x10 days. Supportive therapy also discussed. Follow up if symptoms fail to improve or worsen.     2) Advised to continue using albuterol PRN. Prednisone didn't help, so will not repeat this.      Prescription drug management         BMI:   Estimated body mass index is 26.45 kg/m  as calculated from the following:    Height as of 5/3/22: 1.829 m (6').    Weight as of 5/24/22: 88.5 kg (195 lb).     Return in about 1 week (around 11/28/2022), or if symptoms worsen or fail to improve.    Megha Mcbride PA-C  [facility] LEONARDO Pitt is a 47 year old, presenting for the following health issues:  Illness      History of Present Illness       Reason for visit:  I had flu symptoms 2 weeks ago that lasted 48 hours. Since then I have had an exhausting cough that isn't going away, even after 5 days of Prednisone a week and a half ago.  Symptom onset:  1-2 weeks ago  Symptom intensity:  Severe  Symptom progression:  Staying the same  Had these symptoms before:  Yes  Has tried/received treatment for these symptoms:  Yes  Previous treatment was successful:  Yes  Prior treatment description:  Antibiotics    He eats 2-3 servings of fruits and vegetables daily.He consumes 0 sweetened beverage(s) daily.He exercises with enough effort to increase his heart rate 10 to 19 minutes per day.  He exercises with enough effort to increase his heart rate 3 or less days per week.   He is taking medications  regularly.       Acute Illness  Acute illness concerns: cough  Onset/Duration: 2 weeks  Symptoms:  Fever: No  Chills/Sweats: No  Headache (location?): No  Sinus Pressure: No  Conjunctivitis:  No  Ear Pain: no  Rhinorrhea: YES  Congestion: YES - intermitte  Sore Throat: No  Cough: YES-non-productive, productive of clear sputum, productive of yellow sputum  Wheeze: No  Decreased Appetite: No  Nausea: No  Vomiting: No  Diarrhea: No  Dysuria/Freq.: No  Dysuria or Hematuria: No  Fatigue/Achiness: YES  Sick/Strep Exposure: YES- son  Therapies tried and outcome: prednisone, cough medication     11/10: UC visit, dx post viral cough, prednisone 40mg x5 days, Robitussin AC  11/20: E-visit, was told he needed to be seen in person      Review of Systems   Constitutional, HEENT, cardiovascular, pulmonary, gi and gu systems are negative, except as otherwise noted.      Objective           Vitals:  No vitals were obtained today due to virtual visit.    Physical Exam   GENERAL: Healthy, alert and no distress  EYES: Eyes grossly normal to inspection.  No discharge or erythema, or obvious scleral/conjunctival abnormalities.  RESP: No audible wheeze, cough, or visible cyanosis.  No visible retractions or increased work of breathing.    SKIN: Visible skin clear. No significant rash, abnormal pigmentation or lesions.  NEURO: Cranial nerves grossly intact.  Mentation and speech appropriate for age.  PSYCH: Mentation appears normal, affect normal/bright, judgement and insight intact, normal speech and appearance well-groomed.          Video-Visit Details    Video Start Time: 11:31am    Type of service:  Video Visit    Video End Time: 11:39am, 8 mins    Originating Location (pt. Location): Home        Distant Location (provider location):  On-site    Platform [facility] for Video Visit: [facility]

## 2023-01-05 ENCOUNTER — TELEPHONE (OUTPATIENT)
Dept: FAMILY MEDICINE | Facility: CLINIC | Age: 48
End: 2023-01-05

## 2023-01-05 NOTE — TELEPHONE ENCOUNTER
Prior Authorization Retail Medication Request    Medication/Dose:   ICD code (if different than what is on RX):    Previously Tried and Failed:    Rationale:      Insurance Name:  na  Insurance ID:  [id number]      Pharmacy Information (if different than what is on RX)  Name:  [facility]  Phone:  same

## 2023-01-09 NOTE — TELEPHONE ENCOUNTER
Central Prior Authorization Team  Phone: [phone]    PA Initiation    Medication: fluticasone-salmeterol (AIRDUO RESPICLICK) 113-14 MCG/ACT inhaler  Insurance Company: [employer] - Phone [phone] Fax [phone]  Pharmacy Filling the Rx: [id number] [place] [address] AT [facility]  Filling Pharmacy Phone: [phone]  Filling Pharmacy Fax:    Start Date: 1/9/2023

## 2023-01-10 NOTE — TELEPHONE ENCOUNTER
Central Prior Authorization Team  Phone: [phone]    Prior Authorization Approval    Authorization Effective Date: 1/1/2023  Authorization Expiration Date: 1/10/2024  Medication: fluticasone-salmeterol (AIRDUO RESPICLICK) 113-14 MCG/ACT inhaler  Approved Dose/Quantity:   Reference #:     Insurance Company: [employer] - Phone [phone] Fax [phone]  Expected CoPay:       CoPay Card Available:      Foundation Assistance Needed:    Which Pharmacy is filling the prescription (Not needed for infusion/clinic administered): [id number] - [place] [address] [facility] [address] [facility]  Pharmacy Notified: Yes  Patient Notified:

## 2023-02-26 DIAGNOSIS — J45.30 MILD PERSISTENT ASTHMA WITHOUT COMPLICATION: ICD-10-CM

## 2023-02-27 RX ORDER — FLUTICASONE PROPIONATE AND SALMETEROL 113; 14 UG/1; UG/1
POWDER, METERED RESPIRATORY (INHALATION)
Qty: 1 EACH | Refills: 0 | Status: SHIPPED | OUTPATIENT
Start: 2023-02-27 | End: 2023-03-06

## 2023-03-06 ENCOUNTER — OFFICE VISIT (OUTPATIENT)
Dept: FAMILY MEDICINE | Facility: CLINIC | Age: 48
End: 2023-03-06
Payer: COMMERCIAL

## 2023-03-06 VITALS
BODY MASS INDEX: 27.77 KG/M2 | TEMPERATURE: 97.9 F | HEIGHT: 72 IN | SYSTOLIC BLOOD PRESSURE: 128 MMHG | RESPIRATION RATE: 17 BRPM | OXYGEN SATURATION: 96 % | DIASTOLIC BLOOD PRESSURE: 81 MMHG | WEIGHT: 205 LBS | HEART RATE: 86 BPM

## 2023-03-06 DIAGNOSIS — Z00.00 HEALTHCARE MAINTENANCE: Primary | ICD-10-CM

## 2023-03-06 DIAGNOSIS — Z11.59 NEED FOR HEPATITIS C SCREENING TEST: ICD-10-CM

## 2023-03-06 DIAGNOSIS — J45.30 MILD PERSISTENT ASTHMA WITHOUT COMPLICATION: ICD-10-CM

## 2023-03-06 DIAGNOSIS — Z23 HIGH PRIORITY FOR 2019-NCOV VACCINE: ICD-10-CM

## 2023-03-06 LAB
ALBUMIN SERPL BCG-MCNC: 4.2 G/DL (ref 3.5–5.2)
ALP SERPL-CCNC: 75 U/L (ref 40–129)
ALT SERPL W P-5'-P-CCNC: 17 U/L (ref 10–50)
ANION GAP SERPL CALCULATED.3IONS-SCNC: 11 MMOL/L (ref 7–15)
AST SERPL W P-5'-P-CCNC: 26 U/L (ref 10–50)
BILIRUB SERPL-MCNC: 0.3 MG/DL
BUN SERPL-MCNC: 16 MG/DL (ref 6–20)
CALCIUM SERPL-MCNC: 9.6 MG/DL (ref 8.6–10)
CHLORIDE SERPL-SCNC: 104 MMOL/L (ref 98–107)
CHOLEST SERPL-MCNC: 254 MG/DL
CREAT SERPL-MCNC: 1.09 MG/DL (ref 0.67–1.17)
DEPRECATED HCO3 PLAS-SCNC: 27 MMOL/L (ref 22–29)
ERYTHROCYTE [DISTWIDTH] IN BLOOD BY AUTOMATED COUNT: 13 % (ref 10–15)
GFR SERPL CREATININE-BSD FRML MDRD: 84 ML/MIN/1.73M2
GLUCOSE SERPL-MCNC: 100 MG/DL (ref 70–99)
HCT VFR BLD AUTO: 42.9 % (ref 40–53)
HDLC SERPL-MCNC: 44 MG/DL
HGB BLD-MCNC: 14.5 G/DL (ref 13.3–17.7)
LDLC SERPL CALC-MCNC: 134 MG/DL
MCH RBC QN AUTO: 30.9 PG (ref 26.5–33)
MCHC RBC AUTO-ENTMCNC: 33.8 G/DL (ref 31.5–36.5)
MCV RBC AUTO: 91 FL (ref 78–100)
NONHDLC SERPL-MCNC: 210 MG/DL
PLATELET # BLD AUTO: 279 10E3/UL (ref 150–450)
POTASSIUM SERPL-SCNC: 4.3 MMOL/L (ref 3.4–5.3)
PROT SERPL-MCNC: 7.4 G/DL (ref 6.4–8.3)
RBC # BLD AUTO: 4.7 10E6/UL (ref 4.4–5.9)
SODIUM SERPL-SCNC: 142 MMOL/L (ref 136–145)
TRIGL SERPL-MCNC: 382 MG/DL
WBC # BLD AUTO: 7.4 10E3/UL (ref 4–11)

## 2023-03-06 PROCEDURE — 85027 COMPLETE CBC AUTOMATED: CPT | Performed by: FAMILY MEDICINE

## 2023-03-06 PROCEDURE — 80053 COMPREHEN METABOLIC PANEL: CPT | Performed by: FAMILY MEDICINE

## 2023-03-06 PROCEDURE — 99396 PREV VISIT EST AGE 40-64: CPT | Performed by: FAMILY MEDICINE

## 2023-03-06 PROCEDURE — 91312 COVID-19 VACCINE BIVALENT BOOSTER 12+ (PFIZER): CPT | Performed by: FAMILY MEDICINE

## 2023-03-06 PROCEDURE — 0124A COVID-19 VACCINE BIVALENT BOOSTER 12+ (PFIZER): CPT | Performed by: FAMILY MEDICINE

## 2023-03-06 PROCEDURE — 36415 COLL VENOUS BLD VENIPUNCTURE: CPT | Performed by: FAMILY MEDICINE

## 2023-03-06 PROCEDURE — 80061 LIPID PANEL: CPT | Performed by: FAMILY MEDICINE

## 2023-03-06 RX ORDER — FLUTICASONE PROPIONATE AND SALMETEROL 113; 14 UG/1; UG/1
1 POWDER, METERED RESPIRATORY (INHALATION) 2 TIMES DAILY
Qty: 1 EACH | Refills: 11 | Status: SHIPPED | OUTPATIENT
Start: 2023-03-06 | End: 2024-02-13 | Stop reason: DRUGHIGH

## 2023-03-06 RX ORDER — ALBUTEROL SULFATE 90 UG/1
AEROSOL, METERED RESPIRATORY (INHALATION)
Qty: 18 G | Refills: 3 | Status: SHIPPED | OUTPATIENT
Start: 2023-03-06

## 2023-03-06 ASSESSMENT — ENCOUNTER SYMPTOMS
DYSURIA: 0
CHILLS: 0
WEAKNESS: 0
EYE PAIN: 0
ARTHRALGIAS: 0
HEMATURIA: 0
DIARRHEA: 0
CONSTIPATION: 0
JOINT SWELLING: 0
PALPITATIONS: 0
DIZZINESS: 0
HEARTBURN: 0
ABDOMINAL PAIN: 0
SORE THROAT: 0
SHORTNESS OF BREATH: 0
FEVER: 0
COUGH: 0
HEMATOCHEZIA: 0
MYALGIAS: 1
FREQUENCY: 1
NAUSEA: 0
HEADACHES: 0
PARESTHESIAS: 0
NERVOUS/ANXIOUS: 0

## 2023-03-06 ASSESSMENT — ASTHMA QUESTIONNAIRES
QUESTION_3 LAST FOUR WEEKS HOW OFTEN DID YOUR ASTHMA SYMPTOMS (WHEEZING, COUGHING, SHORTNESS OF BREATH, CHEST TIGHTNESS OR PAIN) WAKE YOU UP AT NIGHT OR EARLIER THAN USUAL IN THE MORNING: NOT AT ALL
QUESTION_4 LAST FOUR WEEKS HOW OFTEN HAVE YOU USED YOUR RESCUE INHALER OR NEBULIZER MEDICATION (SUCH AS ALBUTEROL): ONCE A WEEK OR LESS
QUESTION_5 LAST FOUR WEEKS HOW WOULD YOU RATE YOUR ASTHMA CONTROL: COMPLETELY CONTROLLED
QUESTION_2 LAST FOUR WEEKS HOW OFTEN HAVE YOU HAD SHORTNESS OF BREATH: ONCE OR TWICE A WEEK
ACT_TOTALSCORE: 23
ACT_TOTALSCORE: 23
QUESTION_1 LAST FOUR WEEKS HOW MUCH OF THE TIME DID YOUR ASTHMA KEEP YOU FROM GETTING AS MUCH DONE AT WORK, SCHOOL OR AT HOME: NONE OF THE TIME

## 2023-03-06 ASSESSMENT — PAIN SCALES - GENERAL: PAINLEVEL: NO PAIN (0)

## 2023-03-06 NOTE — PROGRESS NOTES
SUBJECTIVE:   CC: Yoandy is an 47 year old who presents for preventative health visit.     Patient has been advised of split billing requirements and indicates understanding: Yes  Healthy Habits:     Getting at least 3 servings of Calcium per day:  NO    Bi-annual eye exam:  Yes    Dental care twice a year:  Yes    Sleep apnea or symptoms of sleep apnea:  None    Diet:  Low salt and Low fat/cholesterol    Frequency of exercise:  6-7 days/week    Duration of exercise:  15-30 minutes    Taking medications regularly:  Yes    Medication side effects:  None    PHQ-2 Total Score: 0    Additional concerns today:  No      Today's PHQ-2 Score:   PHQ-2 (  [employer]) 3/6/2023   Q1: Little interest or pleasure in doing things 0   Q2: Feeling down, depressed or hopeless 0   PHQ-2 Score 0   PHQ-2 Total Score (12-17 Years)- Positive if 3 or more points; Administer PHQ-A if positive -   Q1: Little interest or pleasure in doing things Not at all   Q2: Feeling down, depressed or hopeless Not at all   PHQ-2 Score 0       Have you ever done Advance Care Planning? (For example, a Health Directive, POLST, or a discussion with a medical provider or your loved ones about your wishes): No, advance care planning information given to patient to review.  Patient plans to discuss their wishes with loved ones or provider.      Social History     Tobacco Use     Smoking status: Former     Types: Cigarettes     Quit date: 2003     Years since quittin.5     Smokeless tobacco: Never   Substance Use Topics     Alcohol use: Not Currently         Alcohol Use 3/6/2023   Prescreen: >3 drinks/day or >7 drinks/week? No   No flowsheet data found.    Last PSA: No results found for: PSA    Reviewed orders with patient. Reviewed health maintenance and updated orders accordingly - Yes  BP Readings from Last 3 Encounters:   23 128/81   22 107/79   10/19/21 130/88    Wt Readings from Last 3 Encounters:   23 93 kg (205 lb)   22 88.5  kg (195 lb)   22 88.5 kg (195 lb)                  Patient Active Problem List   Diagnosis     Anxiety state     Panic disorder without agoraphobia     Allergic rhinitis     HYPERLIPIDEMIA LDL GOAL <160     Mild intermittent asthma without complication     Past Surgical History:   Procedure Laterality Date     COLONOSCOPY N/A 5/3/2022    Procedure: COLONOSCOPY, FLEXIBLE, WITH LESION REMOVAL USING SNARE;  Surgeon: Jorge Nance MD;  Location:  GI     ESOPHAGOSCOPY, GASTROSCOPY, DUODENOSCOPY (EGD), COMBINED N/A 5/3/2022    Procedure: ESOPHAGOGASTRODUODENOSCOPY, WITH BIOPSY;  Surgeon: Jorge Nance MD;  Location:  GI     HC REMOVAL OF TONSILS,<11 Y/O         Social History     Tobacco Use     Smoking status: Former     Types: Cigarettes     Quit date: 2003     Years since quittin.5     Smokeless tobacco: Never   Substance Use Topics     Alcohol use: Not Currently     Family History   Problem Relation Age of Onset     Hypertension Father      Lipids Father      Substance Abuse Father      Cancer Paternal Aunt         cancer, unknown type     Cancer Paternal Grandfather         lung cancer     Unknown/Adopted Paternal Grandfather      Cerebrovascular Disease Paternal Uncle         Great uncle     Depression Mother      Anxiety Disorder Mother      Unknown/Adopted Maternal Grandmother      Unknown/Adopted Maternal Grandfather      Unknown/Adopted Paternal Grandmother      No Known Problems Brother      Substance Abuse Sister      No Known Problems Son      No Known Problems Daughter      No Known Problems Other      Schizophrenia No family hx of      Bipolar Disorder No family hx of      Suicide No family hx of      Dementia No family hx of      [place] Disease No family hx of      Parkinsonism No family hx of      Autism Spectrum Disorder No family hx of      Intellectual Disability (Mental Retardation) No family hx of      Mental Illness No family hx of          Current Outpatient Medications    Medication Sig Dispense Refill     albuterol (VENTOLIN HFA) 108 (90 Base) MCG/ACT inhaler INHALE 2 PUFFS INTO THE LUNGS EVERY 6 HOURS AS NEEDED FOR WHEEZING OR SHORTNESS OF BREATH 18 g 3     fluticasone-salmeterol (AIRDUO RESPICLICK) 113-14 MCG/ACT inhaler Inhale 1 puff into the lungs 2 times daily 1 each 11     MULTI-VITAMIN OR TABS one tablet daily 30 0     atorvastatin (LIPITOR) 20 MG tablet Take 1 tablet (20 mg) by mouth daily 90 tablet 3     Allergies   Allergen Reactions     Cats      Dogs      No Known Drug Allergies      Recent Labs   Lab Test 03/09/22  1150 10/19/21  1220 01/27/21  0842 09/19/18  1524   LDL  --   --  181* 137*   HDL  --   --  56 56   TRIG  --   --  187* 245*   ALT 26 25 27 36   CR 1.23 1.08 1.05 1.12   GFRESTIMATED 73 81 84 71   GFRESTBLACK  --   --  >90 86   POTASSIUM 4.2 4.2 4.0 3.9        Reviewed and updated as needed this visit by clinical staff                  Reviewed and updated as needed this visit by Provider                 Past Medical History:   Diagnosis Date     ANXIETY STATE NOS 11/07/2003     ASTHMA - MILD PERSISTENT 11/11/2005     Hypertension      PANIC DISORDER 11/07/2003      Past Surgical History:   Procedure Laterality Date     COLONOSCOPY N/A 5/3/2022    Procedure: COLONOSCOPY, FLEXIBLE, WITH LESION REMOVAL USING SNARE;  Surgeon: Jorge Nance MD;  Location:  GI     ESOPHAGOSCOPY, GASTROSCOPY, DUODENOSCOPY (EGD), COMBINED N/A 5/3/2022    Procedure: ESOPHAGOGASTRODUODENOSCOPY, WITH BIOPSY;  Surgeon: Jorge Nance MD;  Location:  GI     HC REMOVAL OF TONSILS,<11 Y/O         Review of Systems   Constitutional: Negative for chills and fever.   HENT: Negative for congestion, ear pain, hearing loss and sore throat.    Eyes: Negative for pain and visual disturbance.   Respiratory: Negative for cough and shortness of breath.    Cardiovascular: Negative for chest pain, palpitations and peripheral edema.   Gastrointestinal: Negative for abdominal pain, constipation,  diarrhea, heartburn, hematochezia and nausea.   Genitourinary: Positive for frequency. Negative for dysuria, genital sores, hematuria, impotence, penile discharge and urgency.   Musculoskeletal: Positive for myalgias. Negative for arthralgias and joint swelling.   Skin: Negative for rash.   Neurological: Negative for dizziness, weakness, headaches and paresthesias.   Psychiatric/Behavioral: Negative for mood changes. The patient is not nervous/anxious.          OBJECTIVE:   /81   Pulse 86   Temp 97.9  F (36.6  C) (Temporal)   Resp 17   Ht 1.829 m (6')   Wt 93 kg (205 lb)   SpO2 96%   BMI 27.80 kg/m      Physical Exam  GENERAL: healthy, alert and no distress  EYES: Eyes grossly normal to inspection, PERRL and conjunctivae and sclerae normal  HENT: ear canals and TM's normal, nose and mouth without ulcers or lesions  NECK: no adenopathy, no asymmetry, masses, or scars and thyroid normal to palpation  RESP: lungs clear to auscultation - no rales, rhonchi or wheezes  CV: regular rate and rhythm, normal S1 S2, no S3 or S4, no murmur, click or rub, no peripheral edema and peripheral pulses strong  ABDOMEN: soft, nontender, no hepatosplenomegaly, no masses and bowel sounds normal  MS: no gross musculoskeletal defects noted, no edema  SKIN: no suspicious lesions or rashes  NEURO: Normal strength and tone, mentation intact and speech normal  BACK: no CVA tenderness, no paralumbar tenderness  PSYCH: mentation appears normal, affect normal/bright        ASSESSMENT/PLAN:       ICD-10-CM    1. Healthcare maintenance  Z00.00 CBC with platelets     Comprehensive metabolic panel (BMP + Alb, Alk Phos, ALT, AST, Total. Bili, TP)     Lipid panel reflex to direct LDL Non-fasting      2. Need for hepatitis C screening test  Z11.59       3. Mild persistent asthma without complication  J45.30 albuterol (VENTOLIN HFA) 108 (90 Base) MCG/ACT inhaler     fluticasone-salmeterol (AIRDUO RESPICLICK) 113-14 MCG/ACT inhaler           Patient has been advised of split billing requirements and indicates understanding: Yes      COUNSELING:   Reviewed preventive health counseling, as reflected in patient instructions      BMI:   Estimated body mass index is 27.8 kg/m  as calculated from the following:    Height as of this encounter: 1.829 m (6').    Weight as of this encounter: 93 kg (205 lb).   Weight management plan: Discussed healthy diet and exercise guidelines      He reports that he quit smoking about 19 years ago. He has never used smokeless tobacco.            Zelalem Kwong MD  [facility]

## 2023-04-13 ENCOUNTER — VIRTUAL VISIT (OUTPATIENT)
Dept: FAMILY MEDICINE | Facility: CLINIC | Age: 48
End: 2023-04-13
Payer: COMMERCIAL

## 2023-04-13 DIAGNOSIS — R05.8 COUGH PRESENT FOR GREATER THAN 3 WEEKS: Primary | ICD-10-CM

## 2023-04-13 DIAGNOSIS — J45.30 MILD PERSISTENT ASTHMA WITHOUT COMPLICATION: ICD-10-CM

## 2023-04-13 PROBLEM — J45.20 MILD INTERMITTENT ASTHMA WITHOUT COMPLICATION: Status: ACTIVE | Noted: 2018-10-09

## 2023-04-13 PROBLEM — J45.20 MILD INTERMITTENT ASTHMA WITHOUT COMPLICATION: Status: RESOLVED | Noted: 2018-10-09 | Resolved: 2023-04-13

## 2023-04-13 PROCEDURE — 99214 OFFICE O/P EST MOD 30 MIN: CPT | Mod: VID | Performed by: FAMILY MEDICINE

## 2023-04-13 RX ORDER — DOXYCYCLINE 100 MG/1
100 CAPSULE ORAL 2 TIMES DAILY
Qty: 14 CAPSULE | Refills: 0 | Status: SHIPPED | OUTPATIENT
Start: 2023-04-13 | End: 2023-12-28

## 2023-04-13 RX ORDER — PREDNISONE 50 MG/1
50 TABLET ORAL DAILY
Qty: 5 TABLET | Refills: 0 | Status: SHIPPED | OUTPATIENT
Start: 2023-04-13 | End: 2024-02-13

## 2023-04-13 NOTE — PROGRESS NOTES
Yoandy is a 48 year old who is being evaluated via a billable video visit.      How would you like to obtain your AVS? MyChart  If the video visit is dropped, the invitation should be resent by: Text to cell phone: [phone]  Will anyone else be joining your video visit? No        Assessment & Plan     ICD-10-CM    1. Cough present for greater than 3 weeks  R05.8 predniSONE (DELTASONE) 50 MG tablet     doxycycline hyclate (VIBRAMYCIN) 100 MG capsule     XR Chest 2 Views      2. Mild persistent asthma without complication  J45.30         Medical decision making: Patient with cough present for greater than 3 weeks.  It is deep and hacking.  He started using Mucinex with some productive sputum with slightly yellow-colored.  He denies any green sputum he denies any fever.  Discussed that if this is indeed an asthma flareup, prednisone should help.  He informs me that in such a previous episode, prednisone did not help much and antibiotic finally helped his symptoms.  In that case, I would like patient to have further evaluation that if he may have a COPD kind of picture.  Discussed that we should do an x-ray since this cough has been present for more than 3 weeks.  He is agreeable.  Meanwhile prednisone and doxycycline will be sent to the pharmacy.  We also discussed today about postviral cough syndrome that can last for few weeks.  This can be carefully monitored.  Patient verbalizes understanding of the plan.     MEDICATIONS:   Orders Placed This Encounter   Medications     predniSONE (DELTASONE) 50 MG tablet     Sig: Take 1 tablet (50 mg) by mouth daily     Dispense:  5 tablet     Refill:  0     doxycycline hyclate (VIBRAMYCIN) 100 MG capsule     Sig: Take 1 capsule (100 mg) by mouth 2 times daily     Dispense:  14 capsule     Refill:  0          - Continue other medications without change    Abdoulaye Olson MD  [facility]   Yoandy is a 48 year old, presenting for the  following health issues:  URI         View : No data to display.              URI    History of Present Illness       Reason for visit:  I have a deep cough that won't go away after a month +  Symptom onset:  More than a month  Symptoms include:  Deep cough  Symptom intensity:  Moderate  Symptom progression:  Staying the same  Had these symptoms before:  Yes  What makes it worse:  Running  What makes it better:  Antibiotics    He eats 2-3 servings of fruits and vegetables daily.He consumes 0 sweetened beverage(s) daily.He exercises with enough effort to increase his heart rate 30 to 60 minutes per day.  He exercises with enough effort to increase his heart rate 5 days per week.   He is taking medications regularly.     Patient Active Problem List   Diagnosis     Allergic rhinitis     HYPERLIPIDEMIA LDL GOAL <160     Mild intermittent asthma without complication     Asthma     Current Outpatient Medications   Medication     albuterol (VENTOLIN HFA) 108 (90 Base) MCG/ACT inhaler     doxycycline hyclate (VIBRAMYCIN) 100 MG capsule     fluticasone-salmeterol (AIRDUO RESPICLICK) 113-14 MCG/ACT inhaler     MULTI-VITAMIN OR TABS     predniSONE (DELTASONE) 50 MG tablet     No current facility-administered medications for this visit.         Review of Systems   Constitutional, HEENT, cardiovascular, pulmonary, gi and gu systems are negative, except as otherwise noted.      Objective           Vitals:  No vitals were obtained today due to virtual visit.    Physical Exam   GENERAL: Healthy, alert and no distress  EYES: Eyes grossly normal to inspection.  No discharge or erythema, or obvious scleral/conjunctival abnormalities.  RESP: No audible wheeze, cough, or visible cyanosis.  No visible retractions or increased work of breathing.    SKIN: Visible skin clear. No significant rash, abnormal pigmentation or lesions.  NEURO: Cranial nerves grossly intact.  Mentation and speech appropriate for age.  PSYCH: Mentation appears  normal, affect normal/bright, judgement and insight intact, normal speech and appearance well-groomed.    Office Visit on 03/06/2023   Component Date Value Ref Range Status     WBC Count 03/06/2023 7.4  4.0 - 11.0 10e3/uL Final     RBC Count 03/06/2023 4.70  4.40 - 5.90 10e6/uL Final     Hemoglobin 03/06/2023 14.5  13.3 - 17.7 g/dL Final     Hematocrit 03/06/2023 42.9  40.0 - 53.0 % Final     MCV 03/06/2023 91  78 - 100 fL Final     MCH 03/06/2023 30.9  26.5 - 33.0 pg Final     MCHC 03/06/2023 33.8  31.5 - 36.5 g/dL Final     RDW 03/06/2023 13.0  10.0 - 15.0 % Final     Platelet Count 03/06/2023 279  150 - 450 10e3/uL Final     Sodium 03/06/2023 142  136 - 145 mmol/L Final     Potassium 03/06/2023 4.3  3.4 - 5.3 mmol/L Final     Chloride 03/06/2023 104  98 - 107 mmol/L Final     Carbon Dioxide (CO2) 03/06/2023 27  22 - 29 mmol/L Final     Anion Gap 03/06/2023 11  7 - 15 mmol/L Final     Urea Nitrogen 03/06/2023 16.0  6.0 - 20.0 mg/dL Final     Creatinine 03/06/2023 1.09  0.67 - 1.17 mg/dL Final     Calcium 03/06/2023 9.6  8.6 - 10.0 mg/dL Final     Glucose 03/06/2023 100 (H)  70 - 99 mg/dL Final     Alkaline Phosphatase 03/06/2023 75  40 - 129 U/L Final     AST 03/06/2023 26  10 - 50 U/L Final     ALT 03/06/2023 17  10 - 50 U/L Final     Protein Total 03/06/2023 7.4  6.4 - 8.3 g/dL Final     Albumin 03/06/2023 4.2  3.5 - 5.2 g/dL Final     Bilirubin Total 03/06/2023 0.3  <=1.2 mg/dL Final     GFR Estimate 03/06/2023 84  >60 mL/min/1.73m2 Final    eGFR calculated using 2021 CKD-EPI equation.     Cholesterol 03/06/2023 254 (H)  <200 mg/dL Final     Triglycerides 03/06/2023 382 (H)  <150 mg/dL Final     Direct Measure HDL 03/06/2023 44  >=40 mg/dL Final     LDL Cholesterol Calculated 03/06/2023 134 (H)  <=100 mg/dL Final     Non HDL Cholesterol 03/06/2023 210 (H)  <130 mg/dL Final               Video-Visit Details    Type of service:  Video Visit   Video Start Time: 3:08 PM  Video End Time:3:23 PM    Originating  Location (pt. Location): Home  Distant Location (provider location):  On-site  Platform used for Video Visit: Beltran

## 2023-04-19 ENCOUNTER — ANCILLARY PROCEDURE (OUTPATIENT)
Dept: GENERAL RADIOLOGY | Facility: CLINIC | Age: 48
End: 2023-04-19
Attending: FAMILY MEDICINE
Payer: COMMERCIAL

## 2023-04-19 DIAGNOSIS — R05.8 COUGH PRESENT FOR GREATER THAN 3 WEEKS: ICD-10-CM

## 2023-04-19 PROCEDURE — 71046 X-RAY EXAM CHEST 2 VIEWS: CPT | Mod: TC | Performed by: RADIOLOGY

## 2023-04-20 ENCOUNTER — NURSE TRIAGE (OUTPATIENT)
Dept: FAMILY MEDICINE | Facility: CLINIC | Age: 48
End: 2023-04-20
Payer: COMMERCIAL

## 2023-04-20 ENCOUNTER — MYC MEDICAL ADVICE (OUTPATIENT)
Dept: FAMILY MEDICINE | Facility: CLINIC | Age: 48
End: 2023-04-20
Payer: COMMERCIAL

## 2023-04-20 DIAGNOSIS — J40 BRONCHITIS: Primary | ICD-10-CM

## 2023-04-20 RX ORDER — LEVOCETIRIZINE DIHYDROCHLORIDE 5 MG/1
5 TABLET, FILM COATED ORAL EVERY EVENING
Qty: 20 TABLET | Refills: 0 | Status: SHIPPED | OUTPATIENT
Start: 2023-04-20 | End: 2023-05-09

## 2023-04-20 RX ORDER — PREDNISONE 10 MG/1
TABLET ORAL
Qty: 20 TABLET | Refills: 0 | Status: SHIPPED | OUTPATIENT
Start: 2023-04-20 | End: 2023-12-28

## 2023-04-20 NOTE — TELEPHONE ENCOUNTER
"Yoandy Almanzar  P Ec Triage (supporting Zelalem Kwong MD) 22 minutes ago (12:07 PM)     BG  Hello Dr. Kwong,        I recently had a video visit regarding a cough that won't go away after a cold. I was given Prednisone and Doxycyline and had a chest x-ray done. I have completed the meds yesterday, but still have a cough. Not as severe, but still present. I can feel phlegm vibrating in my lungs. This has been going on for almost 6 weeks. A couple months ago I got the 2nd Covid booster. Potentially related? I'm just not sure where to go from here to get back to \"normal\".       Thank you,               Yoandy Almanzar       Nurse Triage SBAR    Is this a 2nd Level Triage? NO    Situation: Cough persisting 5-6 weeks. Occasional yellow sputum. Feels rattle in his chest, but mostly a dry cough. No longer has severe coughing spells. Denies breathing problem, but is using PRN inhalers more often.     Background: History of asthma. Also see Shareaholict message above.     Assessment: Persistent, mostly non-productive cough.     Protocol Recommended Disposition:   See in Office Within 3 Days    Recommendation: Please advise if OK to use same day slot? Patient is OK waiting longer than 3 days.      Routed to provider    Does the patient meet one of the following criteria for ADS visit consideration? 16+ years old, with an [facility] PCP     TIP  Providers, please consider if this condition is appropriate for management at one of our Acute and Diagnostic Services sites.     If patient is a good candidate, please use dotphrase <dot>triageresponse and select Refer to ADS to document.      Reason for Disposition    Cough has been present for > 3 weeks    Additional Information    Negative: Bluish (or gray) lips or face    Negative: SEVERE difficulty breathing (e.g., struggling for each breath, speaks in single words)    Negative: Rapid onset of cough and has hives    Negative: Coughing started suddenly after medicine, an allergic food or bee " sting    Negative: Difficulty breathing after exposure to flames, smoke, or fumes    Negative: Sounds like a life-threatening emergency to the triager    Negative: Previous asthma attacks and this feels like asthma attack    Negative: Dry cough (non-productive; no sputum or minimal clear sputum) and within 14 days of COVID-19 Exposure    Negative: MODERATE difficulty breathing (e.g., speaks in phrases, SOB even at rest, pulse 100-120) and still present when not coughing    Negative: Chest pain present when not coughing    Negative: Passed out (i.e., fainted, collapsed and was not responding)    Negative: Patient sounds very sick or weak to the triager    Negative: MILD difficulty breathing (e.g., minimal/no SOB at rest, SOB with walking, pulse <100) and still present when not coughing    Negative: Coughed up > 1 tablespoon (15 ml) blood (Exception: Blood-tinged sputum.)    Negative: Fever > 103 F (39.4 C)    Negative: Fever > 101 F (38.3 C) and over 60 years of age    Negative: Fever > 100.0 F (37.8 C) and has diabetes mellitus or a weak immune system (e.g., HIV positive, cancer chemotherapy, organ transplant, splenectomy, chronic steroids)    Negative: Fever > 100.0 F (37.8 C) and bedridden (e.g., nursing home patient, stroke, chronic illness, recovering from surgery)    Negative: Increasing ankle swelling    Negative: Wheezing is present    Negative: SEVERE coughing spells (e.g., whooping sound after coughing, vomiting after coughing)    Negative: Coughing up anthony-colored (reddish-brown) or blood-tinged sputum    Negative: Fever present > 3 days (72 hours)    Negative: Fever returns after gone for over 24 hours and symptoms worse or not improved    Negative: Using nasal washes and pain medicine > 24 hours and sinus pain persists    Negative: Known COPD or other severe lung disease (i.e., bronchiectasis, cystic fibrosis, lung surgery) and worsening symptoms (i.e., increased sputum purulence or amount, increased  "breathing difficulty)    Negative: Continuous (nonstop) coughing interferes with work or school and no improvement using cough treatment per Care Advice    Negative: Patient wants to be seen    Answer Assessment - Initial Assessment Questions  1. ONSET: \"When did the cough begin?\"       Approximately 5 weeks ago  2. SEVERITY: \"How bad is the cough today?\"       Mild to moderate.   3. SPUTUM: \"Describe the color of your sputum\" (none, dry cough; clear, white, yellow, green)      Occasionally coughs up yellow or clear phlegm.   4. HEMOPTYSIS: \"Are you coughing up any blood?\" If so ask: \"How much?\" (flecks, streaks, tablespoons, etc.)      no  5. DIFFICULTY BREATHING: \"Are you having difficulty breathing?\" If Yes, ask: \"How bad is it?\" (e.g., mild, moderate, severe)     - MILD: No SOB at rest, mild SOB with walking, speaks normally in sentences, can lie down, no retractions, pulse < 100.     - MODERATE: SOB at rest, SOB with minimal exertion and prefers to sit, cannot lie down flat, speaks in phrases, mild retractions, audible wheezing, pulse 100-120.     - SEVERE: Very SOB at rest, speaks in single words, struggling to breathe, sitting hunched forward, retractions, pulse > 120       Has been using an albuterol and fluticasone inhalers more frequently. Not having an asthma attack. No difficulty breathing.   6. FEVER: \"Do you have a fever?\" If Yes, ask: \"What is your temperature, how was it measured, and when did it start?\"      No fever  7. CARDIAC HISTORY: \"Do you have any history of heart disease?\" (e.g., heart attack, congestive heart failure)       no  8. LUNG HISTORY: \"Do you have any history of lung disease?\"  (e.g., pulmonary embolus, asthma, emphysema)      asthma  9. PE RISK FACTORS: \"Do you have a history of blood clots?\" (or: recent major surgery, recent prolonged travel, bedridden)      no  10. OTHER SYMPTOMS: \"Do you have any other symptoms?\" (e.g., runny nose, wheezing, chest pain)        No longer having " "runny nose.   11. PREGNANCY: \"Is there any chance you are pregnant?\" \"When was your last menstrual period?\"        NA  12. TRAVEL: \"Have you traveled out of the country in the last month?\" (e.g., travel history, exposures)        no    Protocols used: COUGH-A-BEBE Millard RN    "

## 2023-04-20 NOTE — TELEPHONE ENCOUNTER
Patient given message from Dr. Kwong. Patient states that he is willing to try both longer prednisone taper and an antihistamine.  Patient agrees to check with [facility] later today or tomorrow for a prescription.  Pharmacy attached.  Brissa Millard RN

## 2023-04-20 NOTE — TELEPHONE ENCOUNTER
I don't see any correlation between COVID booster and his clinical sx.   He may need to consider longer prednisone tapering and potential additional treatment with antihistamine. Please check on with him and let me know

## 2023-05-08 DIAGNOSIS — J40 BRONCHITIS: ICD-10-CM

## 2023-05-09 RX ORDER — LEVOCETIRIZINE DIHYDROCHLORIDE 5 MG/1
TABLET, FILM COATED ORAL
Qty: 20 TABLET | Refills: 0 | Status: SHIPPED | OUTPATIENT
Start: 2023-05-09 | End: 2023-12-28

## 2023-12-14 NOTE — TELEPHONE ENCOUNTER
Patient is being scheduled with Dr. Barrios for a right tympanoplasty on 02/21/2024.  She has seen Carolyn previously due to Leiden Factor V and DVT diagnosis with use of Eliquis.  Can Carolyn please provide directions for patient now that her surgery is scheduled?   Thank you!   Prescription for fluticason-salmeterol at the  for  and a message was left to notify the pt.  Allison Mandujano,

## 2023-12-28 ENCOUNTER — VIRTUAL VISIT (OUTPATIENT)
Dept: FAMILY MEDICINE | Facility: CLINIC | Age: 48
End: 2023-12-28
Payer: COMMERCIAL

## 2023-12-28 DIAGNOSIS — R05.3 CHRONIC COUGH: Primary | ICD-10-CM

## 2023-12-28 PROCEDURE — 99441 PR PHYSICIAN TELEPHONE EVALUATION 5-10 MIN: CPT | Mod: 93 | Performed by: STUDENT IN AN ORGANIZED HEALTH CARE EDUCATION/TRAINING PROGRAM

## 2023-12-28 RX ORDER — BENZONATATE 200 MG/1
200 CAPSULE ORAL 3 TIMES DAILY PRN
Qty: 60 CAPSULE | Refills: 0 | Status: SHIPPED | OUTPATIENT
Start: 2023-12-28 | End: 2024-02-13

## 2023-12-28 RX ORDER — IPRATROPIUM BROMIDE 42 UG/1
2 SPRAY, METERED NASAL 4 TIMES DAILY
Qty: 60 ML | Refills: 0 | Status: SHIPPED | OUTPATIENT
Start: 2023-12-28 | End: 2024-02-05

## 2023-12-28 ASSESSMENT — ASTHMA QUESTIONNAIRES: ACT_TOTALSCORE: 20

## 2023-12-28 NOTE — PROGRESS NOTES
Yoandy is a 48 year old who is being evaluated via a billable video visit.      How would you like to obtain your AVS? MyChart  If the video visit is dropped, the invitation should be resent by: Text to cell phone: [phone]  Will anyone else be joining your video visit? No          Assessment & Plan     (R05.3) Chronic cough  (primary encounter diagnosis)  Comment: Chronic, uncontrolled. Known history of asthma. Will try tessalon pearls and ipratropium spray to assist with cough. If no improvement, do recommend a chest xray with consideration of possible antibiotics at that time   Plan: benzonatate (TESSALON) 200 MG capsule,         ipratropium (ATROVENT) 0.06 % nasal spray                     BMI:   Estimated body mass index is 27.8 kg/m  as calculated from the following:    Height as of 3/6/23: 1.829 m (6').    Weight as of 3/6/23: 93 kg (205 lb).   Weight management plan: Patient was referred to their PCP to discuss a diet and exercise plan.        NATI WILLIAM MD  [facility]   Yoandy is a 48 year old, presenting for the following health issues:  cough      History of Present Illness       Reason for visit:  Chronic cough  Symptom onset:  More than a month  Symptoms include:  Strong cough  Symptom intensity:  Moderate  Symptom progression:  Staying the same  Had these symptoms before:  Yes  Has tried/received treatment for these symptoms:  Yes  Previous treatment was successful:  Yes  Prior treatment description:  Prednisone  What makes it worse:  Running  What makes it better:  Nebulizer (temporarily)    He eats 2-3 servings of fruits and vegetables daily.He consumes 0 sweetened beverage(s) daily.He exercises with enough effort to increase his heart rate 20 to 29 minutes per day.  He exercises with enough effort to increase his heart rate 5 days per week.   He is taking medications regularly.     Pt states for over one month that he has had a consistent cough. He reports  that he had a head cold 8 to 9 days ago and the cough was going away but then returned after having a head cold. He describes the cough as dry in consistency. He reports use a duoneb as he woke up not being able to breath very well.                 Review of Systems   CONSTITUTIONAL: NEGATIVE for fever, chills, change in weight  ENT/MOUTH: NEGATIVE for ear, mouth and throat problems  RESP:POSITIVE for cough-non productive  CV: NEGATIVE for chest pain, palpitations or peripheral edema      Objective           Vitals:  No vitals were obtained today due to virtual visit.    Physical Exam   GENERAL: Healthy, alert and no distress  EYES: Eyes grossly normal to inspection.  No discharge or erythema, or obvious scleral/conjunctival abnormalities.  RESP: cough present  SKIN: Visible skin clear. No significant rash, abnormal pigmentation or lesions.  NEURO: Cranial nerves grossly intact.  Mentation and speech appropriate for age.  PSYCH: Mentation appears normal, affect normal/bright, judgement and insight intact, normal speech and appearance well-groomed.                Video-Visit Details    Type of service:  Video Visit   Video Start Time:  phone call 2:15PM  Video End Time:2:22 PM    Originating Location (pt. Location): Home    Distant Location (provider location):  On-site  Platform used for Video Visit: [employer]

## 2024-01-04 NOTE — COMMUNITY RESOURCES LIST (ENGLISH)
01/04/2024   [facility] [employer]  N/A  For questions about this resource list or additional care needs, please contact your primary care clinic or care manager.  Phone: [phone]   Email: N/A   Address: [address] [place] [address]   Hours: N/A        Financial Stability       Rent and mortgage payment assistance  1  [facility] Distance: 2.01 miles      In-Person, Phone/Virtual   [address] [place] [address] [address] [place] [address]  Language: English  Hours: Mon - Fri 8:00 AM - 4:30 PM  Fees: Free   Phone: (563) 330-2328 Email: [email or web address] Website: [email or web address]-services     2  [facility] - [employer] - Homelessness Prevention - Rent and mortgage payment assistance Distance: 2.72 miles      In-Person, Phone/Virtual   [address] [place] [address]  Language: English, Botswanan, Icelandic  Hours: Mon 12:00 AM - 7:00 PM , Tue 10:00 AM - 3:00 PM , Wed - Thu 10:00 AM - 2:30 PM  Fees: Free   Phone: (989) 559-5412 Email: ica@[employer].org Website: http://www.[employer].org          Important Numbers & Websites       Emergency Services   911  [facility]   311  Poison Control   (510) 742-5830  Suicide Prevention Lifeline   (915) 189-2678 (TALK)  Child Abuse Hotline   (527) 331-1213 (4-A-Child)  Sexual Assault Hotline   (128) 372-5569 (HOPE)  National Runaway Safeline   (452) 888-6321 (RUNAWAY)  All-Options Talkline   (133) 972-8370  Substance Abuse Referral   (998) 698-3806 (HELP)

## 2024-01-11 ENCOUNTER — VIRTUAL VISIT (OUTPATIENT)
Dept: FAMILY MEDICINE | Facility: CLINIC | Age: 49
End: 2024-01-11
Payer: COMMERCIAL

## 2024-01-11 DIAGNOSIS — R05.9 COUGH, UNSPECIFIED TYPE: ICD-10-CM

## 2024-01-11 DIAGNOSIS — J20.9 ACUTE BRONCHITIS WITH SYMPTOMS > 10 DAYS: Primary | ICD-10-CM

## 2024-01-11 PROCEDURE — 99213 OFFICE O/P EST LOW 20 MIN: CPT | Mod: 95 | Performed by: FAMILY MEDICINE

## 2024-01-11 RX ORDER — PREDNISONE 20 MG/1
20 TABLET ORAL DAILY
Qty: 5 TABLET | Refills: 0 | Status: SHIPPED | OUTPATIENT
Start: 2024-01-11 | End: 2024-01-16

## 2024-01-11 RX ORDER — AZITHROMYCIN 250 MG/1
TABLET, FILM COATED ORAL
Qty: 6 TABLET | Refills: 0 | Status: SHIPPED | OUTPATIENT
Start: 2024-01-11 | End: 2024-02-13

## 2024-01-11 ASSESSMENT — ENCOUNTER SYMPTOMS: COUGH: 1

## 2024-01-11 NOTE — PROGRESS NOTES
Yoandy is a 48 year old who is being evaluated via a billable video visit.      How would you like to obtain your AVS? MyChart  If the video visit is dropped, the invitation should be resent by: Text to cell phone: [phone]  Will anyone else be joining your video visit? No          Assessment & Plan     Acute bronchitis with symptoms > 10 days  Ongoing symptoms worsening with some cough not improving with some history of asthma suggested prednisone course of antibiotic to see if that helps with his any worsening or changing symptoms advised to follow-up in the clinic.  - REVIEW OF HEALTH MAINTENANCE PROTOCOL ORDERS  - azithromycin (ZITHROMAX) 250 MG tablet; Two tablets first day, then one tablet daily for four days.  - predniSONE (DELTASONE) 20 MG tablet; Take 1 tablet (20 mg) by mouth daily for 5 days    Cough, unspecified type          Elie Sanchez MD  [facility]   Yoandy is a 48 year old, presenting for the following health issues:  Cough (Was prescribed benzonatate and ipratropium nasal spray on 12/28. Medications not currently helping.)  He has underlying history of mild asthma.  Continue to have some cough as well as bronchitis symptoms.  Initially dry sometimes it can get feel productive.  However no breathing difficulty no fever.  He was given symptomatic treatment without any significant relief.      1/11/2024     1:29 PM   Additional Questions   Roomed by Alan Goldstein  This is a new problem. The current episode started more than 1 week ago.    Seen 12/28 - given Benzonatate and Nasal stray, not improving .        Review of Systems   Respiratory:  Positive for cough.             Objective           Vitals:  No vitals were obtained today due to virtual visit.    Physical Exam   GENERAL: Healthy, alert and no distress  EYES: Eyes grossly normal to inspection.  No discharge or erythema, or obvious scleral/conjunctival abnormalities.  RESP: No audible wheeze, cough,  or visible cyanosis.  No visible retractions or increased work of breathing.    SKIN: Visible skin clear. No significant rash, abnormal pigmentation or lesions.  NEURO: Cranial nerves grossly intact.  Mentation and speech appropriate for age.  PSYCH: Mentation appears normal, affect normal/bright, judgement and insight intact, normal speech and appearance well-groomed.            Video-Visit Details    Type of service:  Video Visit   Video Start Time:  2:00  Video End Time: 2:10    Originating Location (pt. Location): Home    Distant Location (provider location):  On-site  Platform used for Video Visit: Juan Ramon  Part of the visit was done through phone as some video issues however able to start the video in the beginning.

## 2024-01-11 NOTE — PROGRESS NOTES
"Yoandy is a 48 year old who is being evaluated via a billable video visit.      How would you like to obtain your AVS? {AVS Preference:840691}  If the video visit is dropped, the invitation should be resent by: {video visit invitation (Optional) :025286}  Will anyone else be joining your video visit? {:364494}  {If patient encounters technical issues they should call [phone] :031401}        {PROVIDER CHARTING PREFERENCE:004749}    Subjective   Yoandy is a 48 year old, presenting for the following health issues:  Cough (Was prescribed benzonatate and ipratropium nasal spray on 12/28. Medications not currently helping.)  {(!) Visit Details have not yet been documented.  Please enter Visit Details and then use this list to pull in documentation. (Optional):882352}    Cough         {SUPERLIST (Optional):048942}  {additonal problems for provider to add (Optional):747396}      Review of Systems   Respiratory:  Positive for cough.       {ROS COMP (Optional):231381}      Objective           Vitals:  No vitals were obtained today due to virtual visit.    Physical Exam   {video visit exam brief selected:522062}    {Diagnostic Test Results (Optional):992631}    {AMBULATORY ATTESTATION (Optional):671828}        Video-Visit Details    Type of service:  Video Visit   Video Start Time: {video visit start/end time for provider to select:798026}  Video End Time:{video visit start/end time for provider to select:967224}    Originating Location (pt. Location): {video visit patient location:830047::\"Home\"}  {PROVIDER LOCATION On-site should be selected for visits conducted from your clinic location or adjoining [facility] hospital, academic office, or other nearby [facility] building. Off-site should be selected for all other provider locations, including home:953866}  Distant Location (provider location):  {virtual location provider:447219}  Platform used for Video Visit: {Virtual Visit Platforms:387983::\"1Rebel\"}      "

## 2024-01-31 ENCOUNTER — VIRTUAL VISIT (OUTPATIENT)
Dept: FAMILY MEDICINE | Facility: CLINIC | Age: 49
End: 2024-01-31
Payer: COMMERCIAL

## 2024-01-31 DIAGNOSIS — R05.9 COUGH, UNSPECIFIED TYPE: Primary | ICD-10-CM

## 2024-01-31 DIAGNOSIS — R05.8 COUGH PRESENT FOR GREATER THAN 3 WEEKS: ICD-10-CM

## 2024-01-31 PROCEDURE — 99213 OFFICE O/P EST LOW 20 MIN: CPT | Mod: 95 | Performed by: FAMILY MEDICINE

## 2024-01-31 RX ORDER — PREDNISONE 10 MG/1
TABLET ORAL
Qty: 30 TABLET | Refills: 1 | Status: SHIPPED | OUTPATIENT
Start: 2024-01-31 | End: 2024-02-13

## 2024-01-31 NOTE — PROGRESS NOTES
Yoandy is a 48 year old who is being evaluated via a billable video visit.      How would you like to obtain your AVS? MyChart  If the video visit is dropped, the invitation should be resent by: Text to cell phone: [phone]  Will anyone else be joining your video visit? No          Assessment & Plan     Cough, unspecified type  Advised to get a chest x-ray to make sure there is no abnormality however I gave him a tapering dose of prednisone to see if that helps.  If symptoms continue I would suggest he should contact pulmonology for further evaluation.  There is no signs or symptoms of an acute infection currently.  - XR Chest 2 Views; Future  - predniSONE (DELTASONE) 10 MG tablet; Take 4 tab X3 days. 3 tab x3 days,2 tab x2 days,1 tab x3 days    Cough present for greater than 3 weeks    - XR Chest 2 Views; Future  - predniSONE (DELTASONE) 10 MG tablet; Take 4 tab X3 days. 3 tab x3 days,2 tab x2 days,1 tab x3 days      Subjective   Yoandy is a 48 year old, presenting for the following health issues:  Cough (still has remnants of a cough 2 weeks after a 5 day steroid and Zpack prescription.)  Patient has an ongoing chronic cough which she has been present for more than few years.  He has previously evaluated for bronchitis and being treated.  He thinks his cough is better but still feel underlying issue.  He has underlying mild asthma as well.      1/31/2024     3:16 PM   Additional Questions   Roomed by Fredrick     History of Present Illness       Reason for visit:  I still have a residual cough 2 weeks after a 5 day steroid and zpack treatment.    He eats 2-3 servings of fruits and vegetables daily.He consumes 0 sweetened beverage(s) daily.He exercises with enough effort to increase his heart rate 10 to 19 minutes per day.  He exercises with enough effort to increase his heart rate 5 days per week.   He is taking medications regularly.         Objective           Vitals:  No vitals were obtained today due to virtual  visit.    Physical Exam   GENERAL: alert and no distress  EYES: Eyes grossly normal to inspection.  No discharge or erythema, or obvious scleral/conjunctival abnormalities.  RESP: No audible wheeze, cough, or visible cyanosis.    SKIN: Visible skin clear. No significant rash, abnormal pigmentation or lesions.  NEURO: Cranial nerves grossly intact.  Mentation and speech appropriate for age.  PSYCH: Appropriate affect, tone, and pace of words        Video-Visit Details    Type of service:  Video Visit   Video Start Time:  4:55  Video End Time: 5:10    Originating Location (pt. Location): Home    Distant Location (provider location):  On-site  Platform used for Video Visit: Beltran   Signed Electronically by: Elie Sanchez MD

## 2024-02-01 ENCOUNTER — ANCILLARY PROCEDURE (OUTPATIENT)
Dept: GENERAL RADIOLOGY | Facility: CLINIC | Age: 49
End: 2024-02-01
Attending: FAMILY MEDICINE
Payer: COMMERCIAL

## 2024-02-01 DIAGNOSIS — R05.9 COUGH, UNSPECIFIED TYPE: ICD-10-CM

## 2024-02-01 DIAGNOSIS — R05.8 COUGH PRESENT FOR GREATER THAN 3 WEEKS: ICD-10-CM

## 2024-02-01 PROCEDURE — 71046 X-RAY EXAM CHEST 2 VIEWS: CPT | Mod: TC | Performed by: RADIOLOGY

## 2024-02-04 DIAGNOSIS — R05.3 CHRONIC COUGH: ICD-10-CM

## 2024-02-05 ENCOUNTER — TELEPHONE (OUTPATIENT)
Dept: FAMILY MEDICINE | Facility: CLINIC | Age: 49
End: 2024-02-05
Payer: COMMERCIAL

## 2024-02-05 ENCOUNTER — PATIENT OUTREACH (OUTPATIENT)
Dept: CARE COORDINATION | Facility: CLINIC | Age: 49
End: 2024-02-05
Payer: COMMERCIAL

## 2024-02-05 RX ORDER — IPRATROPIUM BROMIDE 42 UG/1
SPRAY, METERED NASAL
Qty: 60 ML | Refills: 0 | Status: SHIPPED | OUTPATIENT
Start: 2024-02-05

## 2024-02-05 NOTE — TELEPHONE ENCOUNTER
Prior Authorization Retail Medication Request    Medication/Dose: fluticasone-salmeterol (AIRDUO RESPICLICK) 113-14 MCG/ACT inhaler  1 puff, 2 TIMES DAILY    See Chart    CoverMyMeds key: [id number]

## 2024-02-12 ENCOUNTER — MYC REFILL (OUTPATIENT)
Dept: FAMILY MEDICINE | Facility: CLINIC | Age: 49
End: 2024-02-12
Payer: COMMERCIAL

## 2024-02-12 ENCOUNTER — NURSE TRIAGE (OUTPATIENT)
Dept: FAMILY MEDICINE | Facility: CLINIC | Age: 49
End: 2024-02-12
Payer: COMMERCIAL

## 2024-02-12 ENCOUNTER — MYC MEDICAL ADVICE (OUTPATIENT)
Dept: FAMILY MEDICINE | Facility: CLINIC | Age: 49
End: 2024-02-12
Payer: COMMERCIAL

## 2024-02-12 DIAGNOSIS — J20.9 ACUTE BRONCHITIS WITH SYMPTOMS > 10 DAYS: ICD-10-CM

## 2024-02-12 RX ORDER — AZITHROMYCIN 250 MG/1
TABLET, FILM COATED ORAL
Qty: 6 TABLET | Refills: 0 | OUTPATIENT
Start: 2024-02-12

## 2024-02-12 NOTE — TELEPHONE ENCOUNTER
Patient Contact    Attempt # 1    Was Call answered? No    Non-detailed voicemail left to call clinic at: [phone].     On Call Back:    Please triage patient and schedule an in person appointment.     Heather CARR RN  [facility] Triage Team

## 2024-02-12 NOTE — TELEPHONE ENCOUNTER
Please see other MyChart encounter 2/12/24.    Heather CARR RN  [facility] Triage Team

## 2024-02-13 ENCOUNTER — OFFICE VISIT (OUTPATIENT)
Dept: FAMILY MEDICINE | Facility: CLINIC | Age: 49
End: 2024-02-13
Payer: COMMERCIAL

## 2024-02-13 VITALS
RESPIRATION RATE: 16 BRPM | SYSTOLIC BLOOD PRESSURE: 145 MMHG | HEIGHT: 72 IN | DIASTOLIC BLOOD PRESSURE: 88 MMHG | HEART RATE: 97 BPM | TEMPERATURE: 97.7 F | WEIGHT: 196 LBS | OXYGEN SATURATION: 98 % | BODY MASS INDEX: 26.55 KG/M2

## 2024-02-13 DIAGNOSIS — R05.3 CHRONIC COUGH: Primary | ICD-10-CM

## 2024-02-13 DIAGNOSIS — J45.30 MILD PERSISTENT ASTHMA WITHOUT COMPLICATION: ICD-10-CM

## 2024-02-13 LAB
ERYTHROCYTE [DISTWIDTH] IN BLOOD BY AUTOMATED COUNT: 13.3 % (ref 10–15)
HCT VFR BLD AUTO: 47.3 % (ref 40–53)
HGB BLD-MCNC: 15.3 G/DL (ref 13.3–17.7)
MCH RBC QN AUTO: 29.7 PG (ref 26.5–33)
MCHC RBC AUTO-ENTMCNC: 32.3 G/DL (ref 31.5–36.5)
MCV RBC AUTO: 92 FL (ref 78–100)
PLATELET # BLD AUTO: 261 10E3/UL (ref 150–450)
RBC # BLD AUTO: 5.15 10E6/UL (ref 4.4–5.9)
WBC # BLD AUTO: 15.5 10E3/UL (ref 4–11)

## 2024-02-13 PROCEDURE — 85027 COMPLETE CBC AUTOMATED: CPT | Performed by: PHYSICIAN ASSISTANT

## 2024-02-13 PROCEDURE — 36415 COLL VENOUS BLD VENIPUNCTURE: CPT | Performed by: PHYSICIAN ASSISTANT

## 2024-02-13 PROCEDURE — 99214 OFFICE O/P EST MOD 30 MIN: CPT | Performed by: PHYSICIAN ASSISTANT

## 2024-02-13 RX ORDER — FLUTICASONE PROPIONATE AND SALMETEROL 232; 14 UG/1; UG/1
1 POWDER, METERED RESPIRATORY (INHALATION) 2 TIMES DAILY
Qty: 1 EACH | Refills: 3 | Status: SHIPPED | OUTPATIENT
Start: 2024-02-13

## 2024-02-13 RX ORDER — MONTELUKAST SODIUM 10 MG/1
10 TABLET ORAL AT BEDTIME
Qty: 30 TABLET | Refills: 3 | Status: SHIPPED | OUTPATIENT
Start: 2024-02-13 | End: 2024-07-17

## 2024-02-13 RX ORDER — DOXYCYCLINE 100 MG/1
100 CAPSULE ORAL 2 TIMES DAILY
Qty: 20 CAPSULE | Refills: 0 | Status: SHIPPED | OUTPATIENT
Start: 2024-02-13

## 2024-02-13 ASSESSMENT — PAIN SCALES - GENERAL: PAINLEVEL: NO PAIN (0)

## 2024-02-13 ASSESSMENT — ENCOUNTER SYMPTOMS: COUGH: 1

## 2024-02-13 NOTE — RESULT ENCOUNTER NOTE
Hi Yoandy,    Your white blood cell count was elevated which could be a side effect of the prednisone, but also can be elevated with infection and since you had a fever last night I am going to send in a prescription for a different antibiotic called doxycycline.      Saadia Gerber PA-C

## 2024-02-13 NOTE — PROGRESS NOTES
Kia Pitt is a 48 year old, presenting for the following health issues:  Cough (Since Nov 2023 )        2/13/2024     2:24 PM   Additional Questions   Roomed by Donna     History of Present Illness       Reason for visit:  I still have a residual cough 2 weeks after a 5 day steroid and zpack treatment.    He eats 2-3 servings of fruits and vegetables daily.He consumes 0 sweetened beverage(s) daily.He exercises with enough effort to increase his heart rate 10 to 19 minutes per day.  He exercises with enough effort to increase his heart rate 5 days per week.   He is taking medications regularly.     Pt here with a complaint of cough since mid to end of Nov  Has taken ipratropium nasal spray, tessalon perles which didn't help  Then was prescribed a steroid and Zpack which helped a little, but then cough improved, but didn't subside  Tried another course of steroids without resolution of cough  Had a clear CXR on 2/1/24  He has a minimal runny nose all winter  He does take claritin pretty regularly  Denies sob but can cough if he takes a deep breath    Cough is day and night/worse during the day and sometimes brought on by coughing  Denies any tickle in his throat  He is tired from the cough  Cough mostly dry and rarely prod of clear sputum  Over the w/e he soreness in his legs, but had slept on a futon  That has resolved  Last night he checked his temp and it was 101.3  Woke up with sweats  Today temp has been normal  He has asthma and was using fluticasone only a few times per week, but now using every day-BID  He uses albuterol about once per day  Quit smoking around 2000; smoked on average 5cigs/d off and on for 20 years    Past Medical History:   Diagnosis Date    ANXIETY STATE NOS 11/07/2003    ASTHMA - MILD PERSISTENT 11/11/2005    Hypertension     PANIC DISORDER 11/07/2003     Past Surgical History:   Procedure Laterality Date    COLONOSCOPY N/A 5/3/2022    Procedure: COLONOSCOPY, FLEXIBLE, WITH  LESION REMOVAL USING SNARE;  Surgeon: Jorge Nance MD;  Location:  GI    ESOPHAGOSCOPY, GASTROSCOPY, DUODENOSCOPY (EGD), COMBINED N/A 5/3/2022    Procedure: ESOPHAGOGASTRODUODENOSCOPY, WITH BIOPSY;  Surgeon: Jorge Nance MD;  Location:  GI    HC REMOVAL OF TONSILS,<13 Y/O       Social History     Tobacco Use    Smoking status: Former     Packs/day: 0.50     Years: 8.00     Additional pack years: 0.00     Total pack years: 4.00     Types: Cigarettes     Start date:      Quit date: 2003     Years since quittin.4    Smokeless tobacco: Never   Substance Use Topics    Alcohol use: Not Currently     Current Outpatient Medications   Medication Sig Dispense Refill    albuterol (VENTOLIN HFA) 108 (90 Base) MCG/ACT inhaler INHALE 2 PUFFS INTO THE LUNGS EVERY 6 HOURS AS NEEDED FOR WHEEZING OR SHORTNESS OF BREATH 18 g 3    fluticasone-salmeterol (AIRDUO RESPICLICK) 113-14 MCG/ACT inhaler Inhale 1 puff into the lungs 2 times daily 1 each 11    ipratropium (ATROVENT) 0.06 % nasal spray USE 2 SPRAYS IN EACH NOSTRIL FOUR TIMES DAILY 60 mL 0    MULTI-VITAMIN OR TABS one tablet daily 30 0     Allergies   Allergen Reactions    Cat Hair Extract     Cats     Dog Epithelium Allergy Skin Test     Dogs     No Known Drug Allergy     Pollen Extract      FAMILY HISTORY NOTED AND REVIEWED    PHYSICAL EXAM:    BP (!) 145/88 (BP Location: Left arm, Patient Position: Chair, Cuff Size: Adult Large)   Pulse 97   Temp 97.7  F (36.5  C) (Temporal)   Resp 16   Ht 1.829 m (6')   Wt 88.9 kg (196 lb)   SpO2 98%   BMI 26.58 kg/m      Patient appears non toxic  Ears: TMs pearly grey  Throat: no erythema or exudates  Neck: supple without LA  Lungs: bilat coarse wheezing only on forced expirations  Heart:RRR  Extr: no edema    Results for orders placed or performed in visit on 24   CBC with platelets     Status: Abnormal   Result Value Ref Range    WBC Count 15.5 (H) 4.0 - 11.0 10e3/uL    RBC Count 5.15 4.40 - 5.90 10e6/uL     Hemoglobin 15.3 13.3 - 17.7 g/dL    Hematocrit 47.3 40.0 - 53.0 %    MCV 92 78 - 100 fL    MCH 29.7 26.5 - 33.0 pg    MCHC 32.3 31.5 - 36.5 g/dL    RDW 13.3 10.0 - 15.0 %    Platelet Count 261 150 - 450 10e3/uL         Assessment and Plan:     (R05.3) Chronic cough  (primary encounter diagnosis)  Comment: WBC is 15, 500 today which may be due to infection or prednisone use. Will have him add doxycycline 100mg bid x 10d. Increased his steroid inh and added singulair as well. Referral to pulm placed as will need additional testing if the above plan not effective.  Plan: CBC with platelets, montelukast (SINGULAIR) 10         MG tablet, Adult Pulmonary Medicine          Referral, fluticasone-salmeterol (AIRDUO         RESPICLICK) 232-14 MCG/ACT inhaler, doxycycline        hyclate (VIBRAMYCIN) 100 MG capsule            (J45.30) Mild persistent asthma without complication  Comment: Increased dose of Airduo. Pt has already taken 2 courses of oral steroids so don't want to continue with more of those.  Plan: fluticasone-salmeterol (AIRDUO RESPICLICK)         232-14 MCG/ACT inhaler              Saadia Gerber PA-C

## 2024-02-14 DIAGNOSIS — R05.3 CHRONIC COUGH: Primary | ICD-10-CM

## 2024-02-14 NOTE — Clinical Note
Future Appointments 3/15/2024  2:00 PM    CS PULMONARY FUNCTION      CSPULM              CS 5/15/2024  3:00 PM    Nicole Arellano PA* CSPULM              CS

## 2024-02-19 ENCOUNTER — PATIENT OUTREACH (OUTPATIENT)
Dept: CARE COORDINATION | Facility: CLINIC | Age: 49
End: 2024-02-19
Payer: COMMERCIAL

## 2024-02-19 NOTE — TELEPHONE ENCOUNTER
Retail Pharmacy Prior Authorization Team   Phone: [phone]    PA Initiation    Medication: AIRDUO RESPICLICK 113/14 IN  Insurance Company:    Pharmacy Filling the Rx: [id number] - [place] [address] AT [facility] [address] [facility] [address]  Filling Pharmacy Phone: [phone]  Filling Pharmacy Fax:    Start Date: 2/19/2024

## 2024-02-21 NOTE — TELEPHONE ENCOUNTER
Retail Pharmacy Prior Authorization Team   Phone: [phone]    Prior Authorization Approval    Medication: DUO RESPICLICK 113/14 IN  Authorization Effective Date: 1/1/2024  Authorization Expiration Date: 2/20/2025  Insurance Company: [employer] - Phone [phone] Fax [phone]  Which Pharmacy is filling the prescription: [id number] [place] [address] AT [place] [address]  Pharmacy Notified: YES -  FORCED TO LEAVE A VOICEMAIL AFTER MULTIPLE CALL ATTEMPTS AND NO ANSWER.   Patient Notified: Pharmacy will notify patient once order is ready.

## 2024-05-20 ENCOUNTER — OFFICE VISIT (OUTPATIENT)
Dept: PULMONOLOGY | Facility: CLINIC | Age: 49
End: 2024-05-20
Attending: PHYSICIAN ASSISTANT
Payer: COMMERCIAL

## 2024-05-20 DIAGNOSIS — R05.3 CHRONIC COUGH: ICD-10-CM

## 2024-05-20 PROCEDURE — 94375 RESPIRATORY FLOW VOLUME LOOP: CPT | Performed by: INTERNAL MEDICINE

## 2024-05-20 PROCEDURE — 94729 DIFFUSING CAPACITY: CPT | Performed by: INTERNAL MEDICINE

## 2024-05-20 PROCEDURE — 94726 PLETHYSMOGRAPHY LUNG VOLUMES: CPT | Performed by: INTERNAL MEDICINE

## 2024-05-20 PROCEDURE — 99207 PR NO CHARGE LOS: CPT

## 2024-05-21 LAB
DLCOUNC-%PRED-PRE: 113 %
DLCOUNC-PRE: 34.25 ML/MIN/MMHG
DLCOUNC-PRED: 30.18 ML/MIN/MMHG
ERV-%PRED-PRE: 69 %
ERV-PRE: 1.2 L
ERV-PRED: 1.72 L
EXPTIME-PRE: 7.82 SEC
FEF2575-%PRED-PRE: 108 %
FEF2575-PRE: 3.76 L/SEC
FEF2575-PRED: 3.47 L/SEC
FEFMAX-%PRED-PRE: 83 %
FEFMAX-PRE: 8.41 L/SEC
FEFMAX-PRED: 10.05 L/SEC
FEV1-%PRED-PRE: 100 %
FEV1-PRE: 3.79 L
FEV1FEV6-PRE: 83 %
FEV1FEV6-PRED: 80 %
FEV1FVC-PRE: 81 %
FEV1FVC-PRED: 80 %
FEV1SVC-PRE: 82 %
FEV1SVC-PRED: 78 %
FIFMAX-PRE: 10.23 L/SEC
FRCPLETH-%PRED-PRE: 81 %
FRCPLETH-PRE: 2.93 L
FRCPLETH-PRED: 3.58 L
FVC-%PRED-PRE: 98 %
FVC-PRE: 4.69 L
FVC-PRED: 4.75 L
IC-%PRED-PRE: 98 %
IC-PRE: 3.41 L
IC-PRED: 3.45 L
RVPLETH-%PRED-PRE: 77 %
RVPLETH-PRE: 1.73 L
RVPLETH-PRED: 2.24 L
TLCPLETH-%PRED-PRE: 86 %
TLCPLETH-PRE: 6.34 L
TLCPLETH-PRED: 7.33 L
VA-%PRED-PRE: 91 %
VA-PRE: 6.29 L
VC-%PRED-PRE: 94 %
VC-PRE: 4.61 L
VC-PRED: 4.86 L

## 2024-06-08 ENCOUNTER — HEALTH MAINTENANCE LETTER (OUTPATIENT)
Age: 49
End: 2024-06-08

## 2024-07-17 DIAGNOSIS — R05.3 CHRONIC COUGH: ICD-10-CM

## 2024-07-17 RX ORDER — MONTELUKAST SODIUM 10 MG/1
1 TABLET ORAL AT BEDTIME
Qty: 30 TABLET | Refills: 3 | Status: SHIPPED | OUTPATIENT
Start: 2024-07-17

## 2024-09-23 ENCOUNTER — E-VISIT (OUTPATIENT)
Dept: FAMILY MEDICINE | Facility: CLINIC | Age: 49
End: 2024-09-23
Payer: COMMERCIAL

## 2024-09-23 ENCOUNTER — TELEPHONE (OUTPATIENT)
Dept: FAMILY MEDICINE | Facility: CLINIC | Age: 49
End: 2024-09-23

## 2024-09-23 DIAGNOSIS — L08.9 LOCAL INFECTION OF SKIN AND SUBCUTANEOUS TISSUE: Primary | ICD-10-CM

## 2024-09-23 PROCEDURE — 99423 OL DIG E/M SVC 21+ MIN: CPT | Performed by: FAMILY MEDICINE

## 2024-09-23 RX ORDER — MUPIROCIN CALCIUM 20 MG/G
CREAM TOPICAL 2 TIMES DAILY
Qty: 30 G | Refills: 0 | Status: SHIPPED | OUTPATIENT
Start: 2024-09-23 | End: 2024-09-27

## 2024-09-23 NOTE — TELEPHONE ENCOUNTER
Prior Authorization Retail Medication Request    Medication/Dose: mupirocin (BACTROBAN) 2 % external cream  Diagnosis and ICD code (if different than what is on RX):    New/renewal/insurance change PA/secondary ins. PA:  Previously Tried and Failed:    Rationale:      Insurance   Primary:   Insurance ID:  [id number]    Secondary (if applicable):  Insurance ID:      Pharmacy Information (if different than what is on RX)  Name:    Phone:    Fax:

## 2024-09-25 NOTE — TELEPHONE ENCOUNTER
Central Prior Authorization Team   Phone: [phone]    PA Initiation    Medication: mupirocin (BACTROBAN) 2 % external cream  Insurance Company: [facility] - Phone [phone] Fax [phone]  Pharmacy Filling the Rx: [id number] [place] [address] AT [facility]  Filling Pharmacy Phone: [phone]  Filling Pharmacy Fax:    Start Date: 9/25/2024

## 2024-09-27 RX ORDER — MUPIROCIN 20 MG/G
OINTMENT TOPICAL 2 TIMES DAILY
Qty: 30 G | Refills: 0 | Status: SHIPPED | OUTPATIENT
Start: 2024-09-27

## 2024-09-27 NOTE — TELEPHONE ENCOUNTER
PRIOR AUTHORIZATION DENIED    Medication: mupirocin (BACTROBAN) 2 % external cream-PA DENIED     Denial Date: 9/26/2024    Denial Rational:             Appeal Information:

## 2024-12-26 ENCOUNTER — VIRTUAL VISIT (OUTPATIENT)
Dept: URGENT CARE | Facility: CLINIC | Age: 49
End: 2024-12-26
Payer: COMMERCIAL

## 2024-12-26 ENCOUNTER — NURSE TRIAGE (OUTPATIENT)
Dept: FAMILY MEDICINE | Facility: CLINIC | Age: 49
End: 2024-12-26

## 2024-12-26 DIAGNOSIS — R05.1 ACUTE COUGH: Primary | ICD-10-CM

## 2024-12-26 DIAGNOSIS — R05.3 CHRONIC COUGH: ICD-10-CM

## 2024-12-26 DIAGNOSIS — J45.20 MILD INTERMITTENT ASTHMA WITHOUT COMPLICATION: ICD-10-CM

## 2024-12-26 RX ORDER — DOXYCYCLINE HYCLATE 100 MG
100 TABLET ORAL 2 TIMES DAILY
Qty: 14 TABLET | Refills: 0 | Status: SHIPPED | OUTPATIENT
Start: 2024-12-26 | End: 2025-01-02

## 2024-12-26 RX ORDER — DOXYCYCLINE 100 MG/1
100 CAPSULE ORAL 2 TIMES DAILY
Qty: 20 CAPSULE | Refills: 0 | Status: SHIPPED | OUTPATIENT
Start: 2024-12-26

## 2024-12-26 RX ORDER — PREDNISONE 20 MG/1
20 TABLET ORAL 2 TIMES DAILY
Qty: 10 TABLET | Refills: 0 | Status: SHIPPED | OUTPATIENT
Start: 2024-12-26 | End: 2024-12-31

## 2024-12-26 NOTE — PROGRESS NOTES
Yoandy is a 49 year old who is being evaluated via a billable video visit.          Assessment & Plan     Acute cough    - predniSONE (DELTASONE) 20 MG tablet; Take 1 tablet (20 mg) by mouth 2 times daily for 5 days.  - doxycycline hyclate (VIBRA-TABS) 100 MG tablet; Take 1 tablet (100 mg) by mouth 2 times daily for 7 days.    Mild intermittent asthma without complication    - predniSONE (DELTASONE) 20 MG tablet; Take 1 tablet (20 mg) by mouth 2 times daily for 5 days.  - doxycycline hyclate (VIBRA-TABS) 100 MG tablet; Take 1 tablet (100 mg) by mouth 2 times daily for 7 days.          Return in about 1 week (around 1/2/2025), or if symptoms worsen or fail to improve.    Subjective   Yoandy is a 49 year old, presenting for the following health issues:  No chief complaint on file.      Video Start Time:  1430    HPI     Yoandy presents with frequent productive cough for the past 8 to 9 days.  Started running a fever 2 days ago which lasted 24 hours and has not reoccurred.  Has a history of mild intermittent asthma on inhalers.  Has not heard any wheezing but sometimes he coughs instead of wheezes.  States he had something similar 9 months ago was treated with prednisone and doxycycline and it helped him greatly.  Denies any chest pain or shortness of breath.  Has been able to go to work today without any issues other than the frequent coughing        Review of Systems  Constitutional, HEENT, cardiovascular, pulmonary, gi and gu systems are negative, except as otherwise noted.      Objective           Vitals:  No vitals were obtained today due to virtual visit.    Physical Exam   GENERAL: alert and no distress  RESP: No audible wheeze or visible cyanosis.  Occasional dry cough.  Can speak in complete sentences without any difficulty  PSYCH: Appropriate affect, tone, and pace of words          Video-Visit Details    Type of service:  Video Visit   Video End Time: 1438  Originating Location (pt. Location): Other  work    Distant Location (provider location):  Off-site  Platform used for Video Visit: Juan Ramon  Signed Electronically by: [facility]

## 2024-12-26 NOTE — TELEPHONE ENCOUNTER
Patient has virtual urgent care visit scheduled and was told to call clinic and speak with triage nurse . No further questions or concerns per patient.     Tigist Campo RN      Reason for Disposition   Patient wants to be seen    Additional Information   Negative: Bluish (or gray) lips or face   Negative: SEVERE difficulty breathing (e.g., struggling for each breath, speaks in single words)   Negative: Rapid onset of cough and has hives   Negative: Coughing started suddenly after medicine, an allergic food or bee sting   Negative: Difficulty breathing after exposure to flames, smoke, or fumes   Negative: Sounds like a life-threatening emergency to the triager   Negative: Previous asthma attacks and this feels like asthma attack   Negative: Dry cough (non-productive; no sputum or minimal clear sputum) and within 14 days of COVID-19 Exposure   Negative: MODERATE difficulty breathing (e.g., speaks in phrases, SOB even at rest, pulse 100-120) and still present when not coughing   Negative: Chest pain present when not coughing   Negative: Passed out (e.g., fainted, lost consciousness, blacked out and was not responding)   Negative: Patient sounds very sick or weak to the triager   Negative: MILD difficulty breathing (e.g., minimal/no SOB at rest, SOB with walking, pulse <100) and still present when not coughing   Negative: Coughed up > 1 tablespoon (15 ml) blood (Exception: Blood-tinged sputum.)   Negative: Fever > 103 F (39.4 C)   Negative: Fever > 101 F (38.3 C) and over 60 years of age   Negative: Fever > 100 F (37.8 C) and has diabetes mellitus or a weak immune system (e.g., HIV positive, cancer chemotherapy, organ transplant, splenectomy, chronic steroids)   Negative: Fever > 100 F (37.8 C) and bedridden (e.g., CVA, chronic illness, recovering from surgery)   Negative: Increasing ankle swelling   Negative: Wheezing is present   Negative: SEVERE coughing spells (e.g., whooping sound after coughing, vomiting after  "coughing)   Negative: Coughing up anthony-colored (reddish-brown) or blood-tinged sputum   Negative: Fever present > 3 days (72 hours)   Negative: Fever returns after gone for over 24 hours and symptoms worse or not improved   Negative: Using nasal washes and pain medicine > 24 hours and sinus pain persists   Negative: Known COPD or other severe lung disease (i.e., bronchiectasis, cystic fibrosis, lung surgery) and symptoms getting worse (i.e., increased sputum purulence or amount, increased breathing difficulty)    Answer Assessment - Initial Assessment Questions  1. ONSET: \"When did the cough begin?\"       1 1/2 week-2 weeks ago  2. SEVERITY: \"How bad is the cough today?\"       Cough is always there  3. SPUTUM: \"Describe the color of your sputum\" (e.g., none, dry cough; clear, white, yellow, green)      Clear to yellow sputum  4. HEMOPTYSIS: \"Are you coughing up any blood?\" If Yes, ask: \"How much?\" (e.g., flecks, streaks, tablespoons, etc.)      no  5. DIFFICULTY BREATHING: \"Are you having difficulty breathing?\" If Yes, ask: \"How bad is it?\" (e.g., mild, moderate, severe)       none  6. FEVER: \"Do you have a fever?\" If Yes, ask: \"What is your temperature, how was it measured, and when did it start?\"     Saturday for 12 hours  7. CARDIAC HISTORY: \"Do you have any history of heart disease?\" (e.g., heart attack, congestive heart failure)       none  8. LUNG HISTORY: \"Do you have any history of lung disease?\"  (e.g., pulmonary embolus, asthma, emphysema)      Exercise induced asthma  9. PE RISK FACTORS: \"Do you have a history of blood clots?\" (or: recent major surgery, recent prolonged travel, bedridden)      none  10. OTHER SYMPTOMS: \"Do you have any other symptoms?\" (e.g., runny nose, wheezing, chest pain)        none  11. PREGNANCY: \"Is there any chance you are pregnant?\" \"When was your last menstrual period?\"        NA  12. TRAVEL: \"Have you traveled out of the country in the last month?\" (e.g., travel history, " exposures)        none    Protocols used: Cough-A-OH

## 2025-01-08 DIAGNOSIS — J45.30 MILD PERSISTENT ASTHMA WITHOUT COMPLICATION: ICD-10-CM

## 2025-01-09 RX ORDER — ALBUTEROL SULFATE 90 UG/1
INHALANT RESPIRATORY (INHALATION)
Qty: 18 G | Refills: 3 | Status: SHIPPED | OUTPATIENT
Start: 2025-01-09

## 2025-06-15 ENCOUNTER — HEALTH MAINTENANCE LETTER (OUTPATIENT)
Age: 50
End: 2025-06-15

## (undated) RX ORDER — FENTANYL CITRATE 50 UG/ML
INJECTION, SOLUTION INTRAMUSCULAR; INTRAVENOUS
Status: DISPENSED
Start: 2022-05-03